# Patient Record
Sex: MALE | Race: WHITE | NOT HISPANIC OR LATINO | Employment: FULL TIME | URBAN - METROPOLITAN AREA
[De-identification: names, ages, dates, MRNs, and addresses within clinical notes are randomized per-mention and may not be internally consistent; named-entity substitution may affect disease eponyms.]

---

## 2017-03-30 ENCOUNTER — PROCEDURE VISIT (OUTPATIENT)
Dept: PULMONOLOGY | Facility: CLINIC | Age: 32
End: 2017-03-30
Payer: COMMERCIAL

## 2017-03-30 ENCOUNTER — OFFICE VISIT (OUTPATIENT)
Dept: SLEEP MEDICINE | Facility: CLINIC | Age: 32
End: 2017-03-30
Payer: COMMERCIAL

## 2017-03-30 ENCOUNTER — HOSPITAL ENCOUNTER (OUTPATIENT)
Dept: RADIOLOGY | Facility: HOSPITAL | Age: 32
Discharge: HOME | End: 2017-03-30
Attending: INTERNAL MEDICINE
Payer: COMMERCIAL

## 2017-03-30 VITALS
HEIGHT: 70 IN | WEIGHT: 141 LBS | OXYGEN SATURATION: 98 % | SYSTOLIC BLOOD PRESSURE: 130 MMHG | HEART RATE: 78 BPM | BODY MASS INDEX: 20.19 KG/M2 | DIASTOLIC BLOOD PRESSURE: 78 MMHG | RESPIRATION RATE: 18 BRPM

## 2017-03-30 DIAGNOSIS — J45.30 MILD PERSISTENT ASTHMA WITHOUT COMPLICATION: ICD-10-CM

## 2017-03-30 DIAGNOSIS — J45.40 MODERATE PERSISTENT ASTHMA WITHOUT COMPLICATION: ICD-10-CM

## 2017-03-30 DIAGNOSIS — J45.20 MILD INTERMITTENT ASTHMA WITHOUT COMPLICATION: Primary | ICD-10-CM

## 2017-03-30 LAB
PRE FEF 25 75: 3.69 L/S (ref 3.73–5.33)
PRE FET 100: 7.73 S
PRE FEV1 FVC: 81 %
PRE FEV1: 4.07 L (ref 4.1–4.89)
PRE FIF 50: 2.04 L/S
PRE FVC: 5.02 L (ref 5.04–5.97)
PRE PEF: 7.81 L/S (ref 9.11–11.43)
PREDICTED FEV1 FVC: 82.07 % (ref 77.24–86.91)
PREDICTED FEV1: 4.5 L (ref 4.1–4.89)
PREDICTED FVC: 5.51 L (ref 5.04–5.97)
PROVOCATION PROTOCOL: ABNORMAL

## 2017-03-30 PROCEDURE — 94010 BREATHING CAPACITY TEST: CPT | Mod: S$GLB,,, | Performed by: INTERNAL MEDICINE

## 2017-03-30 PROCEDURE — 1160F RVW MEDS BY RX/DR IN RCRD: CPT | Mod: S$GLB,,, | Performed by: INTERNAL MEDICINE

## 2017-03-30 PROCEDURE — 99999 PR PBB SHADOW E&M-EST. PATIENT-LVL III: CPT | Mod: PBBFAC,,, | Performed by: INTERNAL MEDICINE

## 2017-03-30 PROCEDURE — 71020 XR CHEST PA AND LATERAL: CPT | Mod: 26,,, | Performed by: RADIOLOGY

## 2017-03-30 PROCEDURE — 71020 XR CHEST PA AND LATERAL: CPT | Mod: TC,PO

## 2017-03-30 PROCEDURE — 99214 OFFICE O/P EST MOD 30 MIN: CPT | Mod: 25,S$GLB,, | Performed by: INTERNAL MEDICINE

## 2017-03-30 RX ORDER — MONTELUKAST SODIUM 10 MG/1
10 TABLET ORAL DAILY
Qty: 90 TABLET | Refills: 3 | Status: SHIPPED | OUTPATIENT
Start: 2017-03-30 | End: 2018-03-30

## 2017-03-30 RX ORDER — ALBUTEROL SULFATE 90 UG/1
2 AEROSOL, METERED RESPIRATORY (INHALATION) EVERY 6 HOURS PRN
Qty: 18 G | Refills: 5 | Status: SHIPPED | OUTPATIENT
Start: 2017-03-30 | End: 2021-11-08

## 2017-03-30 NOTE — ASSESSMENT & PLAN NOTE
ACT was 19  FEV1 improved by 109%, FVC improved by 27%    Immunisations: declined Flu  FEV1 was 4.07L( 91%)  FEV1/FVC 82    Stable

## 2017-03-30 NOTE — PROGRESS NOTES
Subjective:        Luis Skaggs is a 31 y.o. male who has previously been evaluated here for asthma and presents for an asthma follow-up.    Follow-up appointment.  Last clinic visit June 2016.  Asthma.  Score is 19.  No cough no wheezing or shortness of breath  Immunizations pneumococcal up-to-date declined influenza  Medications reviewed Qvar) clean and montelukast  Spirometry show that he has made significant improvements.    FEV1 improved by 109%.  FVC improved by 27%.  Spirometry today is normal FEV1 4.07 (91% predicted) FEV1/FVC 81.  Chest x-ray reviewed normal  Follow-up in 12 months  Medication refills done  Weigth gain from 131 lb to 141 lb      The following portions of the patient's history were reviewed and updated as appropriate:   He  has a past medical history of Asthma.  He  does not have any pertinent problems on file.  He  has a past surgical history that includes Colonoscopy w/ biopsies.  His family history includes Hypertension in his father.  He  reports that he quit smoking about 4 years ago. He has a 3.00 pack-year smoking history. He does not have any smokeless tobacco history on file. He reports that he drinks alcohol. He reports that he does not use illicit drugs.  He has a current medication list which includes the following prescription(s): albuterol, beclomethasone, montelukast, buspirone, hyoscyamine, and pantoprazole.  Current Outpatient Prescriptions on File Prior to Visit   Medication Sig Dispense Refill    [DISCONTINUED] albuterol (VENTOLIN HFA) 90 mcg/actuation inhaler Inhale 2 puffs into the lungs every 6 (six) hours as needed for Wheezing. 18 g 5    [DISCONTINUED] beclomethasone (QVAR) 80 mcg/actuation Aero Inhale 4 puffs into the lungs 2 (two) times daily. 120 each 11    [DISCONTINUED] montelukast (SINGULAIR) 10 mg tablet       busPIRone (BUSPAR) 10 MG tablet Take 1 tablet (10 mg total) by mouth 2 (two) times daily. 30 tablet 0    hyoscyamine (ANASPAZ,LEVSIN) 0.125 mg Tab  "Take 1 tablet (125 mcg total) by mouth 2 (two) times daily as needed. 60 tablet 2    pantoprazole (PROTONIX) 40 MG tablet Take 1 tablet (40 mg total) by mouth once daily. 30 tablet 0     No current facility-administered medications on file prior to visit.      He has No Known Allergies..    Review of Systems  A comprehensive review of systems was negative.      Objective:      /78  Pulse 78  Resp 18  Ht 5' 10" (1.778 m)  Wt 64 kg (141 lb)  SpO2 98%  BMI 20.23 kg/m2  General appearance: alert, appears stated age, cooperative and no distress  Eyes: negative findings: conjunctivae and sclerae normal  Nose: no discharge  Throat: lips, mucosa, and tongue normal; teeth and gums normal  Neck: no adenopathy, no carotid bruit, no JVD, supple, symmetrical, trachea midline and thyroid not enlarged, symmetric, no tenderness/mass/nodules  Lungs: clear to auscultation bilaterally and normal percussion bilaterally  Heart: regular rate and rhythm, S1, S2 normal, no murmur, click, rub or gallop  Abdomen: soft, non-tender; bowel sounds normal; no masses,  no organomegaly  Extremities: extremities normal, atraumatic, no cyanosis or edema  Pulses: 2+ and symmetric  Skin: Skin color, texture, turgor normal. No rashes or lesions  Lymph nodes: Cervical, supraclavicular, and axillary nodes normal.  Neurologic: Grossly normal    Diagnostic Review  Chest x-ray:   Technique: PA and lateral views of the chest was obtained    Findings: The cardiac and mediastinal silhouettes are within normal limits.   The lungs are clear bilaterally. Visualized osseous structures demonstrate no acute abnormality.       Impression        No acute findings           Pulmonary function tests: FEV1: 4.07  (91 % predicted), FVC:  5.02 (91 % predicted), FEV1/FVC:  81       Assessment:      Mild persistent asthma. The patient is not currently having an exacerbation. In general, the patient's disease is well controlled.       Problem List Items Addressed " This Visit     Asthma - Primary     ACT was 19  FEV1 improved by 109%, FVC improved by 27%    Immunisations: declined Flu  FEV1 was 4.07L( 91%)  FEV1/FVC 82    Stable         Relevant Medications    beclomethasone (QVAR) 80 mcg/actuation Aero    albuterol (VENTOLIN HFA) 90 mcg/actuation inhaler    montelukast (SINGULAIR) 10 mg tablet    Other Relevant Orders    X-Ray Chest PA And Lateral    Spirometry with/without bronchodilator        Plan:      Return in about 1 year (around 3/30/2018) for cxr, dylan.    This note was prepared using voice recognition system and is likely to have sound alike errors that may have been overlooked even after proof reading.  Please call me with any questions    Discussed diagnosis, its evaluation, treatment and usual course. All questions answered.    Thank you for the courtesy of participating in the care of this patient    South Schwartz MD

## 2017-03-30 NOTE — MR AVS SNAPSHOT
[facility]  [address]  [place] [address]  Phone: [phone]                  Luis Skaggs   3/30/2017 2:00 PM   Office Visit    Description:  Male : 1985   Provider:  South Schwartz MD   Department:  [facility]           Reason for Visit     Asthma           Diagnoses this Visit        Comments    Mild intermittent asthma without complication    -  Primary            To Do List           Future Appointments        Provider Department Dept Phone    3/9/2018 10:00 AM [facility] XR2 [facility] [phone]    3/9/2018 10:20 AM PULMONARY LAB, [facility] [phone]    3/9/2018 10:40 AM South Schwartz MD [facility] [phone]      Goals (5 Years of Data)     None      Follow-Up and Disposition     Return in about 1 year (around 3/30/2018) for cxr, dylan.       These Medications        Disp Refills Start End    beclomethasone (QVAR) 80 mcg/actuation Aero 120 each 11 3/30/2017 3/30/2018    Inhale 4 puffs into the lungs 2 (two) times daily. - Inhalation    Pharmacy: [address] [address] [address] [place] Ph #: [phone]       albuterol (VENTOLIN HFA) 90 mcg/actuation inhaler 18 g 5 3/30/2017 3/30/2018    Inhale 2 puffs into the lungs every 6 (six) hours as needed for Wheezing. - Inhalation    Pharmacy: [address] Ph #: [phone]       Notes to Pharmacy: Please call patient to pick prescription once it is ready.    montelukast (SINGULAIR) 10 mg tablet 90 tablet 3 3/30/2017 3/30/2018    Take 1 tablet (10 mg total) by mouth once daily. - Oral    Pharmacy: [address] - [address] [address] Ph #: [phone]         [facility]     [facility] Nurse Care Line -  Assistance  Unless otherwise directed by your provider, please contact [facility],  "our nurse care line that is available for 24/7 assistance.     Registered nurses in the [facility] provide: appointment scheduling, clinical advisement, health education, and other advisory services.  Call: [phone] (toll free)               Medications           Message regarding Medications     Verify the changes and/or additions to your medication regime listed below are the same as discussed with your clinician today.  If any of these changes or additions are incorrect, please notify your healthcare provider.        CHANGE how you are taking these medications     Start Taking Instead of    montelukast (SINGULAIR) 10 mg tablet montelukast (SINGULAIR) 10 mg tablet    Dosage:  Take 1 tablet (10 mg total) by mouth once daily.     Reason for Change:  Reorder            Verify that the below list of medications is an accurate representation of the medications you are currently taking.  If none reported, the list may be blank. If incorrect, please contact your healthcare provider. Carry this list with you in case of emergency.           Current Medications     albuterol (VENTOLIN HFA) 90 mcg/actuation inhaler Inhale 2 puffs into the lungs every 6 (six) hours as needed for Wheezing.    beclomethasone (QVAR) 80 mcg/actuation Aero Inhale 4 puffs into the lungs 2 (two) times daily.    busPIRone (BUSPAR) 10 MG tablet Take 1 tablet (10 mg total) by mouth 2 (two) times daily.    hyoscyamine (ANASPAZ,LEVSIN) 0.125 mg Tab Take 1 tablet (125 mcg total) by mouth 2 (two) times daily as needed.    montelukast (SINGULAIR) 10 mg tablet Take 1 tablet (10 mg total) by mouth once daily.    pantoprazole (PROTONIX) 40 MG tablet Take 1 tablet (40 mg total) by mouth once daily.           Clinical Reference Information           Your Vitals Were     BP Pulse Resp Height Weight SpO2    130/78 78 18 5' 10" (1.778 m) 64 kg (141 lb) 98%    BMI                20.23 kg/m2          Blood Pressure          Most Recent Value    BP  " 130/78      Allergies as of 3/30/2017     No Known Allergies      Immunizations Administered on Date of Encounter - 3/30/2017     None      Orders Placed During Today's Visit     Future Labs/Procedures Expected by Expires    X-Ray Chest PA And Lateral  3/30/2017 3/30/2018    Spirometry with/without bronchodilator  As directed 3/30/2018      Language Assistance Services     ATTENTION: Language assistance services are available, free of charge. Please call [phone].      ATENCIÓN: Si habla español, tiene a platt disposición servicios gratuitos de asistencia lingüística. Llame al [phone].     CHÚ Ý: N?u b?n nói Ti?ng Vi?t, có các d?ch v? h? tr? ngôn ng? mi?n phí dành cho b?n. G?i s? [phone].         [facility] complies with applicable Federal civil rights laws and does not discriminate on the basis of race, color, national origin, age, disability, or sex.

## 2017-07-27 ENCOUNTER — TELEPHONE (OUTPATIENT)
Dept: DERMATOLOGY | Facility: CLINIC | Age: 32
End: 2017-07-27

## 2017-07-27 NOTE — TELEPHONE ENCOUNTER
----- Message from Dayna Clayton sent at 7/27/2017  8:24 AM CDT -----  Contact: pt mom-Libby  States patient is scheduled for an appt on today with NP. States wants to know if the patient can be worked in for an appt on next week with Dr. Souza was advised of availability. Please adv/call [phone].//thanks. cw      Spoke with mom and informed her I spoke to patient and got him scheduled for 9/18/17 with Dr Rudd and he is also put on a waiting list so if someone cancels he can be seen sooner.  Mr Sharifa verbalized understanding to information given and so did mom.

## 2017-08-13 ENCOUNTER — HOSPITAL ENCOUNTER (EMERGENCY)
Facility: HOSPITAL | Age: 32
Discharge: HOME | End: 2017-08-13
Attending: EMERGENCY MEDICINE
Payer: COMMERCIAL

## 2017-08-13 DIAGNOSIS — R06.00 DYSPNEA, UNSPECIFIED TYPE: Primary | ICD-10-CM

## 2017-08-13 PROCEDURE — 99284 EMERGENCY DEPT VISIT MOD MDM: CPT

## 2017-08-13 NOTE — ED NOTES
Pt refusing to allow staff to get vitals. Does not appear in any distress. Alert and oriented. Law enforcement at BS and pt handcuffed to stretcher.

## 2017-08-13 NOTE — ED NOTES
Pt still refusing to allow us to obtain vitals. Dr. Schaeffer in room and pt is refusing to allow him to assess. Pt states that we need to call his GI doctor and that he only wants his GI doctor treating him. Dr. Schaeffer explained the ED process of consulting specialists for patients and that it requires for us to assess patients. Pt still refuses to allow us to treat or assess him. Dr. Schaeffer asked pt who his GI doctor is but pt wasn't able to provide their name. Pt does not appear to be in any distress.  at BS and pt still handcuffed to bed. Pt is currently being disrespectful to staff and disturbing neighboring patient.

## 2017-08-13 NOTE — ED NOTES
Patient is still refusing to allow me to obtain vitals and Dr. Schaeffer to assess. He refuses to sign the AMA paper but is refusing treatment and assessment. Refusal witnessed by CATHLEEN Cramer.

## 2017-08-13 NOTE — ED PROVIDER NOTES
"SCRIBE #1 NOTE: I, Selena Priceo, am scribing for, and in the presence of, Kofi Schaeffer Jr., MD. I have scribed the entire note.      History      Chief Complaint   Patient presents with    Shortness of Breath     onset after police stopped pt. refused vitals from paramedics.       Review of patient's allergies indicates:  No Known Allergies     HPI   HPI    8/13/2017, 1:41 AM   History obtained from the patient      History of Present Illness: Luis Skaggs is a 32 y.o. male patient who presents to the Emergency Department for evaluation. Per , pt was pulled over for suspicion of DWI when began saying he was SOB. Per EMS, pt used his own albuterol inhaler on scene. Pt refused vitals from paramedics. Pt states SOB has resolved.  Pt speaking in full sentences and declining to answer general questions, like "when did your symptoms start?" and "what medications are you taking?"  Pt is able to walk unassisted, but is extremely agitated since being placed under arrest.  Police present. Pt is refusing vitals and examination in ED. Pt is requesting to speak his gastroenterologist. HPI limited secondary to patient being non-compliant.    Arrival mode: EMS     PCP: Zohaib Liu MD       Past Medical History:  Past Medical History:   Diagnosis Date    Asthma        Past Surgical History:  Past Surgical History:   Procedure Laterality Date    COLONOSCOPY W/ BIOPSIES           Family History:  Family History   Problem Relation Age of Onset    Hypertension Father        Social History:  Social History     Social History Main Topics    Smoking status: Former Smoker     Packs/day: 1.00     Years: 3.00     Quit date: 1/19/2013    Smokeless tobacco: Unknown    Alcohol use Yes    Drug use: No    Sexual activity: Yes     Partners: Female     Birth control/ protection: Condom, OCP       ROS   Review of Systems   Unable to perform ROS: Other       Physical Exam      Initial Vitals   BP Pulse Resp " Temp SpO2   -- -- -- -- --      MAP       --          Physical Exam  Nursing Notes Reviewed.  Pt refused physical exam and vital signs.   Constitutional: Patient is in no acute distress. Well-developed and well-nourished.  Head: Atraumatic. Normocephalic.  Pulmonary/Chest: Pt appears to be breathing without difficulty.  Neurological:  Pt is able to speak in full sentences without difficulty.   Psychiatric: Alert and oriented. No HI or SI.    ED Course    Procedures  ED Vital Signs:  There were no vitals filed for this visit.           The Emergency Provider reviewed the vital signs and test results, which are outlined above.    ED Discussion     1:48 AM: Re-evaluated pt. Asked to examine patient, but pt refused on 3 separate occasions during ER visit. Discussed with patient that he cannot be treated without exam or not answering questions. Pt is refusing examine, specifically listening to his heart and lungs. Pt refused labs, imaging studies, and treatment including breathing treatments or steroids.  at bedside. Pt is A&O x3, appropriate and competent at this time. Pt voices desire to leave hospital. D/w pt in length need for further evaluation and treatment due to HPI and PEx. Pt declines any further evaluation or tx at this time. All risks, including worsening sx, permanent bodily harm and death, were discussed in length. Pt acknowledges all risk at this time and agrees to sign AMA form. Pt given RTER instructions. All questions and concerns addressed at this time. Pt leaving AMA.     ED Medication(s):  Medications - No data to display    Discharge Medication List as of 8/13/2017  1:58 AM          Follow-up Information     Zohaib Liu MD. Schedule an appointment as soon as possible for a visit in 1 week.    Specialty:  Internal Medicine  Contact information:  [address] [place] [address] [phone]             [facility]    Specialty:  Emergency  Medicine  Why:  As needed, If symptoms worsen  Contact information:  [address]  [facility] [address] [phone]                   Medical Decision Making              Scribe Attestation:   Scribe #1: I performed the above scribed service and the documentation accurately describes the services I performed. I attest to the accuracy of the note.    Attending:   Physician Attestation Statement for Scribe #1: I, Kofi Schaeffer Jr., MD, personally performed the services described in this documentation, as scribed by Selena Milian, in my presence, and it is both accurate and complete.          Clinical Impression       ICD-10-CM ICD-9-CM   1. Dyspnea, unspecified type R06.00 786.09       Disposition:   Disposition: AMA  Condition: Fair         Kofi Schaeffer Jr., MD  08/14/17 0034

## 2017-08-14 NOTE — ED NOTES
8/14/17 @ [facility] status noted. Chart and provider note reviewed. No further actions indicated or taken at this time.

## 2021-04-28 ENCOUNTER — PATIENT MESSAGE (OUTPATIENT)
Dept: RESEARCH | Facility: HOSPITAL | Age: 36
End: 2021-04-28

## 2021-11-08 ENCOUNTER — HOSPITAL ENCOUNTER (EMERGENCY)
Facility: HOSPITAL | Age: 36
Discharge: HOME | End: 2021-11-08
Attending: EMERGENCY MEDICINE
Payer: COMMERCIAL

## 2021-11-08 VITALS
HEART RATE: 75 BPM | DIASTOLIC BLOOD PRESSURE: 61 MMHG | RESPIRATION RATE: 13 BRPM | SYSTOLIC BLOOD PRESSURE: 114 MMHG | TEMPERATURE: 98 F | OXYGEN SATURATION: 97 %

## 2021-11-08 DIAGNOSIS — R55 SYNCOPE: Primary | ICD-10-CM

## 2021-11-08 LAB
ALBUMIN SERPL BCP-MCNC: 3.9 G/DL (ref 3.5–5.2)
ALP SERPL-CCNC: 92 U/L (ref 55–135)
ALT SERPL W/O P-5'-P-CCNC: 16 U/L (ref 10–44)
AMPHET+METHAMPHET UR QL: NEGATIVE
ANION GAP SERPL CALC-SCNC: 12 MMOL/L (ref 8–16)
AST SERPL-CCNC: 20 U/L (ref 10–40)
BARBITURATES UR QL SCN>200 NG/ML: NEGATIVE
BASOPHILS # BLD AUTO: 0.03 K/UL (ref 0–0.2)
BASOPHILS NFR BLD: 0.5 % (ref 0–1.9)
BENZODIAZ UR QL SCN>200 NG/ML: NEGATIVE
BILIRUB SERPL-MCNC: 0.4 MG/DL (ref 0.1–1)
BILIRUB UR QL STRIP: NEGATIVE
BNP SERPL-MCNC: 29 PG/ML (ref 0–99)
BUN SERPL-MCNC: 13 MG/DL (ref 6–20)
BZE UR QL SCN: NEGATIVE
CALCIUM SERPL-MCNC: 8.9 MG/DL (ref 8.7–10.5)
CANNABINOIDS UR QL SCN: ABNORMAL
CHLORIDE SERPL-SCNC: 106 MMOL/L (ref 95–110)
CLARITY UR: CLEAR
CO2 SERPL-SCNC: 23 MMOL/L (ref 23–29)
COLOR UR: YELLOW
CREAT SERPL-MCNC: 1.1 MG/DL (ref 0.5–1.4)
CREAT UR-MCNC: 88 MG/DL (ref 23–375)
DIFFERENTIAL METHOD: ABNORMAL
EOSINOPHIL # BLD AUTO: 0.1 K/UL (ref 0–0.5)
EOSINOPHIL NFR BLD: 1.9 % (ref 0–8)
ERYTHROCYTE [DISTWIDTH] IN BLOOD BY AUTOMATED COUNT: 12.6 % (ref 11.5–14.5)
EST. GFR  (AFRICAN AMERICAN): >60 ML/MIN/1.73 M^2
EST. GFR  (NON AFRICAN AMERICAN): >60 ML/MIN/1.73 M^2
ETHANOL SERPL-MCNC: <10 MG/DL
GLUCOSE SERPL-MCNC: 104 MG/DL (ref 70–110)
GLUCOSE UR QL STRIP: NEGATIVE
HCT VFR BLD AUTO: 36.3 % (ref 40–54)
HGB BLD-MCNC: 12.1 G/DL (ref 14–18)
HGB UR QL STRIP: NEGATIVE
IMM GRANULOCYTES # BLD AUTO: 0.02 K/UL (ref 0–0.04)
IMM GRANULOCYTES NFR BLD AUTO: 0.3 % (ref 0–0.5)
KETONES UR QL STRIP: NEGATIVE
LEUKOCYTE ESTERASE UR QL STRIP: NEGATIVE
LYMPHOCYTES # BLD AUTO: 1.7 K/UL (ref 1–4.8)
LYMPHOCYTES NFR BLD: 26.8 % (ref 18–48)
MCH RBC QN AUTO: 32 PG (ref 27–31)
MCHC RBC AUTO-ENTMCNC: 33.3 G/DL (ref 32–36)
MCV RBC AUTO: 96 FL (ref 82–98)
METHADONE UR QL SCN>300 NG/ML: NEGATIVE
MONOCYTES # BLD AUTO: 0.3 K/UL (ref 0.3–1)
MONOCYTES NFR BLD: 4.6 % (ref 4–15)
NEUTROPHILS # BLD AUTO: 4.2 K/UL (ref 1.8–7.7)
NEUTROPHILS NFR BLD: 65.9 % (ref 38–73)
NITRITE UR QL STRIP: NEGATIVE
NRBC BLD-RTO: 0 /100 WBC
OPIATES UR QL SCN: NEGATIVE
PCP UR QL SCN>25 NG/ML: NEGATIVE
PH UR STRIP: 6 [PH] (ref 5–8)
PLATELET # BLD AUTO: 132 K/UL (ref 150–450)
PMV BLD AUTO: 11.4 FL (ref 9.2–12.9)
POTASSIUM SERPL-SCNC: 3.9 MMOL/L (ref 3.5–5.1)
PROT SERPL-MCNC: 6.6 G/DL (ref 6–8.4)
PROT UR QL STRIP: ABNORMAL
RBC # BLD AUTO: 3.78 M/UL (ref 4.6–6.2)
SODIUM SERPL-SCNC: 141 MMOL/L (ref 136–145)
SP GR UR STRIP: >=1.03 (ref 1–1.03)
TOXICOLOGY INFORMATION: ABNORMAL
TROPONIN I SERPL DL<=0.01 NG/ML-MCNC: <0.006 NG/ML (ref 0–0.03)
URN SPEC COLLECT METH UR: ABNORMAL
UROBILINOGEN UR STRIP-ACNC: NEGATIVE EU/DL
WBC # BLD AUTO: 6.34 K/UL (ref 3.9–12.7)

## 2021-11-08 PROCEDURE — 85025 COMPLETE CBC W/AUTO DIFF WBC: CPT | Performed by: EMERGENCY MEDICINE

## 2021-11-08 PROCEDURE — 82077 ASSAY SPEC XCP UR&BREATH IA: CPT | Performed by: EMERGENCY MEDICINE

## 2021-11-08 PROCEDURE — 99285 EMERGENCY DEPT VISIT HI MDM: CPT | Mod: 25

## 2021-11-08 PROCEDURE — 83880 ASSAY OF NATRIURETIC PEPTIDE: CPT | Performed by: EMERGENCY MEDICINE

## 2021-11-08 PROCEDURE — 93010 EKG 12-LEAD: ICD-10-PCS | Mod: ,,, | Performed by: INTERNAL MEDICINE

## 2021-11-08 PROCEDURE — 80307 DRUG TEST PRSMV CHEM ANLYZR: CPT | Performed by: EMERGENCY MEDICINE

## 2021-11-08 PROCEDURE — 96360 HYDRATION IV INFUSION INIT: CPT

## 2021-11-08 PROCEDURE — 80053 COMPREHEN METABOLIC PANEL: CPT | Performed by: EMERGENCY MEDICINE

## 2021-11-08 PROCEDURE — 81003 URINALYSIS AUTO W/O SCOPE: CPT | Mod: 59 | Performed by: EMERGENCY MEDICINE

## 2021-11-08 PROCEDURE — 93005 ELECTROCARDIOGRAM TRACING: CPT

## 2021-11-08 PROCEDURE — 93010 ELECTROCARDIOGRAM REPORT: CPT | Mod: ,,, | Performed by: INTERNAL MEDICINE

## 2021-11-08 PROCEDURE — 25000003 PHARM REV CODE 250: Performed by: EMERGENCY MEDICINE

## 2021-11-08 PROCEDURE — 84484 ASSAY OF TROPONIN QUANT: CPT | Performed by: EMERGENCY MEDICINE

## 2021-11-08 RX ORDER — EPINEPHRINE INHALATION 125 UG/1
1 AEROSOL RESPIRATORY (INHALATION) DAILY PRN
COMMUNITY
End: 2022-08-04

## 2021-11-08 RX ORDER — DIPHENHYDRAMINE HCL 25 MG
25 CAPSULE ORAL EVERY 6 HOURS PRN
COMMUNITY
End: 2022-08-04

## 2021-11-08 RX ADMIN — SODIUM CHLORIDE 1000 ML: 0.9 INJECTION, SOLUTION INTRAVENOUS at 08:11

## 2021-12-20 ENCOUNTER — HOSPITAL ENCOUNTER (EMERGENCY)
Facility: HOSPITAL | Age: 36
Discharge: HOME | End: 2021-12-20
Attending: EMERGENCY MEDICINE
Payer: COMMERCIAL

## 2021-12-20 ENCOUNTER — NURSE TRIAGE (OUTPATIENT)
Dept: ADMINISTRATIVE | Facility: CLINIC | Age: 36
End: 2021-12-20
Payer: COMMERCIAL

## 2021-12-20 VITALS
SYSTOLIC BLOOD PRESSURE: 109 MMHG | HEIGHT: 70 IN | WEIGHT: 148.81 LBS | HEART RATE: 73 BPM | TEMPERATURE: 98 F | RESPIRATION RATE: 16 BRPM | OXYGEN SATURATION: 100 % | BODY MASS INDEX: 21.3 KG/M2 | DIASTOLIC BLOOD PRESSURE: 56 MMHG

## 2021-12-20 DIAGNOSIS — R56.9 SEIZURE: ICD-10-CM

## 2021-12-20 LAB
ALBUMIN SERPL BCP-MCNC: 3.8 G/DL (ref 3.5–5.2)
ALP SERPL-CCNC: 103 U/L (ref 55–135)
ALT SERPL W/O P-5'-P-CCNC: 18 U/L (ref 10–44)
AMPHET+METHAMPHET UR QL: NEGATIVE
ANION GAP SERPL CALC-SCNC: 8 MMOL/L (ref 8–16)
AST SERPL-CCNC: 22 U/L (ref 10–40)
BARBITURATES UR QL SCN>200 NG/ML: NEGATIVE
BASOPHILS # BLD AUTO: 0.02 K/UL (ref 0–0.2)
BASOPHILS NFR BLD: 0.3 % (ref 0–1.9)
BENZODIAZ UR QL SCN>200 NG/ML: NEGATIVE
BILIRUB SERPL-MCNC: 0.3 MG/DL (ref 0.1–1)
BILIRUB UR QL STRIP: NEGATIVE
BUN SERPL-MCNC: 14 MG/DL (ref 6–20)
BZE UR QL SCN: NEGATIVE
CALCIUM SERPL-MCNC: 8.7 MG/DL (ref 8.7–10.5)
CANNABINOIDS UR QL SCN: NEGATIVE
CHLORIDE SERPL-SCNC: 106 MMOL/L (ref 95–110)
CLARITY UR: CLEAR
CO2 SERPL-SCNC: 28 MMOL/L (ref 23–29)
COLOR UR: YELLOW
CREAT SERPL-MCNC: 1 MG/DL (ref 0.5–1.4)
CREAT UR-MCNC: 125.1 MG/DL (ref 23–375)
DIFFERENTIAL METHOD: ABNORMAL
EOSINOPHIL # BLD AUTO: 0.1 K/UL (ref 0–0.5)
EOSINOPHIL NFR BLD: 1.7 % (ref 0–8)
ERYTHROCYTE [DISTWIDTH] IN BLOOD BY AUTOMATED COUNT: 12.4 % (ref 11.5–14.5)
EST. GFR  (AFRICAN AMERICAN): >60 ML/MIN/1.73 M^2
EST. GFR  (NON AFRICAN AMERICAN): >60 ML/MIN/1.73 M^2
ETHANOL SERPL-MCNC: <10 MG/DL
GLUCOSE SERPL-MCNC: 96 MG/DL (ref 70–110)
GLUCOSE UR QL STRIP: NEGATIVE
HCT VFR BLD AUTO: 34.5 % (ref 40–54)
HCV AB SERPL QL IA: NEGATIVE
HEP C VIRUS HOLD SPECIMEN: NORMAL
HGB BLD-MCNC: 11.3 G/DL (ref 14–18)
HGB UR QL STRIP: NEGATIVE
HIV 1+2 AB+HIV1 P24 AG SERPL QL IA: NEGATIVE
IMM GRANULOCYTES # BLD AUTO: 0.01 K/UL (ref 0–0.04)
IMM GRANULOCYTES NFR BLD AUTO: 0.2 % (ref 0–0.5)
KETONES UR QL STRIP: NEGATIVE
LEUKOCYTE ESTERASE UR QL STRIP: NEGATIVE
LYMPHOCYTES # BLD AUTO: 2.1 K/UL (ref 1–4.8)
LYMPHOCYTES NFR BLD: 36 % (ref 18–48)
MCH RBC QN AUTO: 31.9 PG (ref 27–31)
MCHC RBC AUTO-ENTMCNC: 32.8 G/DL (ref 32–36)
MCV RBC AUTO: 98 FL (ref 82–98)
METHADONE UR QL SCN>300 NG/ML: NEGATIVE
MONOCYTES # BLD AUTO: 0.4 K/UL (ref 0.3–1)
MONOCYTES NFR BLD: 7.2 % (ref 4–15)
NEUTROPHILS # BLD AUTO: 3.2 K/UL (ref 1.8–7.7)
NEUTROPHILS NFR BLD: 54.6 % (ref 38–73)
NITRITE UR QL STRIP: NEGATIVE
NRBC BLD-RTO: 0 /100 WBC
OPIATES UR QL SCN: NEGATIVE
PCP UR QL SCN>25 NG/ML: NEGATIVE
PH UR STRIP: 7 [PH] (ref 5–8)
PLATELET # BLD AUTO: 187 K/UL (ref 150–450)
PMV BLD AUTO: 11.1 FL (ref 9.2–12.9)
POTASSIUM SERPL-SCNC: 4.3 MMOL/L (ref 3.5–5.1)
PROT SERPL-MCNC: 6.6 G/DL (ref 6–8.4)
PROT UR QL STRIP: NEGATIVE
RBC # BLD AUTO: 3.54 M/UL (ref 4.6–6.2)
SODIUM SERPL-SCNC: 142 MMOL/L (ref 136–145)
SP GR UR STRIP: 1.02 (ref 1–1.03)
TOXICOLOGY INFORMATION: NORMAL
URN SPEC COLLECT METH UR: NORMAL
UROBILINOGEN UR STRIP-ACNC: NEGATIVE EU/DL
WBC # BLD AUTO: 5.86 K/UL (ref 3.9–12.7)

## 2021-12-20 PROCEDURE — 82077 ASSAY SPEC XCP UR&BREATH IA: CPT | Performed by: NURSE PRACTITIONER

## 2021-12-20 PROCEDURE — 85025 COMPLETE CBC W/AUTO DIFF WBC: CPT | Performed by: NURSE PRACTITIONER

## 2021-12-20 PROCEDURE — 93010 EKG 12-LEAD: ICD-10-PCS | Mod: ,,, | Performed by: INTERNAL MEDICINE

## 2021-12-20 PROCEDURE — 36000 PLACE NEEDLE IN VEIN: CPT

## 2021-12-20 PROCEDURE — 87389 HIV-1 AG W/HIV-1&-2 AB AG IA: CPT | Performed by: EMERGENCY MEDICINE

## 2021-12-20 PROCEDURE — 81003 URINALYSIS AUTO W/O SCOPE: CPT | Mod: 59 | Performed by: NURSE PRACTITIONER

## 2021-12-20 PROCEDURE — 86803 HEPATITIS C AB TEST: CPT | Performed by: EMERGENCY MEDICINE

## 2021-12-20 PROCEDURE — 99285 EMERGENCY DEPT VISIT HI MDM: CPT | Mod: 25

## 2021-12-20 PROCEDURE — 93005 ELECTROCARDIOGRAM TRACING: CPT

## 2021-12-20 PROCEDURE — 93010 ELECTROCARDIOGRAM REPORT: CPT | Mod: ,,, | Performed by: INTERNAL MEDICINE

## 2021-12-20 PROCEDURE — 80307 DRUG TEST PRSMV CHEM ANLYZR: CPT | Performed by: NURSE PRACTITIONER

## 2021-12-20 PROCEDURE — 25000003 PHARM REV CODE 250: Performed by: EMERGENCY MEDICINE

## 2021-12-20 PROCEDURE — 80053 COMPREHEN METABOLIC PANEL: CPT | Performed by: NURSE PRACTITIONER

## 2021-12-20 RX ORDER — LEVETIRACETAM 500 MG/1
1000 TABLET ORAL ONCE
Status: COMPLETED | OUTPATIENT
Start: 2021-12-20 | End: 2021-12-20

## 2021-12-20 RX ORDER — LEVETIRACETAM 500 MG/1
500 TABLET ORAL 2 TIMES DAILY
Qty: 60 TABLET | Refills: 1 | Status: SHIPPED | OUTPATIENT
Start: 2021-12-20 | End: 2022-02-10 | Stop reason: SDUPTHER

## 2021-12-20 RX ADMIN — LEVETIRACETAM 1000 MG: 500 TABLET, FILM COATED ORAL at 05:12

## 2021-12-27 PROBLEM — G56.01 CARPAL TUNNEL SYNDROME, RIGHT UPPER LIMB: Status: ACTIVE | Noted: 2020-08-25

## 2021-12-28 ENCOUNTER — OFFICE VISIT (OUTPATIENT)
Dept: FAMILY MEDICINE | Facility: CLINIC | Age: 36
End: 2021-12-28
Payer: COMMERCIAL

## 2021-12-28 VITALS
HEIGHT: 70 IN | SYSTOLIC BLOOD PRESSURE: 129 MMHG | HEART RATE: 103 BPM | OXYGEN SATURATION: 99 % | TEMPERATURE: 98 F | WEIGHT: 142.5 LBS | DIASTOLIC BLOOD PRESSURE: 68 MMHG | BODY MASS INDEX: 20.4 KG/M2

## 2021-12-28 DIAGNOSIS — F19.20 DRUG ABUSE AND DEPENDENCE: ICD-10-CM

## 2021-12-28 DIAGNOSIS — G40.909 SEIZURE DISORDER: Primary | ICD-10-CM

## 2021-12-28 PROCEDURE — 99999 PR PBB SHADOW E&M-EST. PATIENT-LVL III: ICD-10-PCS | Mod: PBBFAC,,, | Performed by: REGISTERED NURSE

## 2021-12-28 PROCEDURE — 99214 OFFICE O/P EST MOD 30 MIN: CPT | Mod: S$GLB,,, | Performed by: REGISTERED NURSE

## 2021-12-28 PROCEDURE — 99214 PR OFFICE/OUTPT VISIT, EST, LEVL IV, 30-39 MIN: ICD-10-PCS | Mod: S$GLB,,, | Performed by: REGISTERED NURSE

## 2021-12-28 PROCEDURE — 99999 PR PBB SHADOW E&M-EST. PATIENT-LVL III: CPT | Mod: PBBFAC,,, | Performed by: REGISTERED NURSE

## 2022-02-09 ENCOUNTER — TELEPHONE (OUTPATIENT)
Dept: NEUROLOGY | Facility: CLINIC | Age: 37
End: 2022-02-09
Payer: COMMERCIAL

## 2022-02-09 NOTE — TELEPHONE ENCOUNTER
Spoke with pt mother in regards to getting medication refill. Advise mother that her appt was scheduled incorrectly, pt would need to establish care with Dr. Tipton. She would have to contact pt PCP in regards to get refill. I can assist her with scheduling appt correctly, she verbalized understanding and advise to give her a call in about an hour since she is traveling.

## 2022-02-09 NOTE — TELEPHONE ENCOUNTER
----- Message from Eufemia Clayton sent at 2/9/2022  2:58 PM CST -----  Contact: [phone]/ Mom Libby  Patient would like to get medical advice.    Comments:   Patient mom would like to know what should do about medication. States until he is seen by a neurologist.

## 2022-02-09 NOTE — TELEPHONE ENCOUNTER
----- Message from Candice Huggins sent at 2/9/2022 12:02 PM CST -----  Contact: Mrs Skaggs  Mother would like to have a call back at  in regards to getting a temporary supply for the patient's medication.    Medication : levETIRAcetam (KEPPRA) 500 MG [address] [place] [address] [facility]  [address] [facility]  [place] [address]  Phone: [phone] Fax: [phone]    Thanks

## 2022-02-09 NOTE — TELEPHONE ENCOUNTER
Contacted pt mother in regards to appt scheduled incorrectly. Pt mother was trying to get refill for his Keppra medication advise her we weren't able to give her refills because we have not seen patient. She would need to contact his PCP and she verbalized that they weren't doing anything to help and why I was telling her she needed to go to them. After we got the appointment scheduled correctly she was going on about how it was far out and she needed to be seen sooner. I let her know we only have one neurologist doctor. Told her I added him to the waiting list in case something sooner becomes available, she said she heard that one before and that she couldn't wait until she got the survey in the mail. Ask for my name and Neurology  name. I apologized to her for the in convince in regards to appt being scheduled incorrectly and medication refill.

## 2022-02-10 RX ORDER — LEVETIRACETAM 500 MG/1
500 TABLET ORAL 2 TIMES DAILY
Qty: 60 TABLET | Refills: 0 | Status: SHIPPED | OUTPATIENT
Start: 2022-02-10 | End: 2022-03-11

## 2022-02-10 NOTE — TELEPHONE ENCOUNTER
Spoke with pt's mom and she stated that pt is on his last 2 pills for seizures and he needs a refill. Mom is out of town and she has been trying to get in touch with Yaneli Martin for a refill since Tuesday and still no response. Please advise

## 2022-02-11 NOTE — TELEPHONE ENCOUNTER
Spoken to mother again.  She was very ugly towards me.  I have faxed the Rx over 3 times and have gotten conformation that they have received it.  She said that I need to stop everything that I am doing right now and make sure the Rx is at [facility].  Told her I am in clinic and I will get to it today.  She said she doesn't care that I am in clinic and wants this taken care of now.  I have spoken to her earlier today and told her it will be taken care of today and that I could not give her a time frame.  She hung up

## 2022-02-11 NOTE — TELEPHONE ENCOUNTER
----- Message from Ada Muller MA sent at 2/11/2022  7:57 AM CST -----  SEE MESSAGE BELOW. PT NEVER SEEN [facility]        States his seizure medication (Keppra) is still not at the pharmacy. Today is his last day of meds. Pt uses     [facility] [address] [place], [place] - [address]   [address] [address] [address]   Phone: [phone] Fax: [phone]     Please call Libby Skaggs [phone]. Thank you         ----- Message -----  From: Julia Marks  Sent: 2/11/2022   7:56 AM CST  To: [facility]oyer Staff    States his seizure medication (Keppra) is still not at the pharmacy. Today is his last day of meds. States she has been speaking with Marilyn. Pt uses     [facility] - [place], [place] - [address]  [address] [address] [place] [address]  Phone: [phone] Fax: [phone]    Please call Libby Skaggs [phone]. Thank you

## 2022-02-11 NOTE — TELEPHONE ENCOUNTER
----- Message from Candice Huggins sent at 2/11/2022  9:28 AM CST -----  Contact: Mrs Skaggs  Mother would like a call back at [phone]  in regards to the patient's medication levETIRAcetam (KEPPRA) 500 MG Tab. She states that the pharmacy does not have it yet. She states that he does not have medication for today.      [address] [place] [address] [address]  [address] [address] [place] [address]  Phone: [phone] Fax: [phone]      Thanks

## 2022-02-11 NOTE — TELEPHONE ENCOUNTER
----- Message from Shanon Blancas sent at 2/11/2022 11:11 AM CST -----  Contact: Mother, Libby, [phone]  Calling because the pharmacy did not receive Rx levETIRAcetam (KEPPRA) 500 MG Tab. This needs to be sent today. He will be out of medication tomorrow. The patient's mother is very upset. Please call her. Thanks,        [place] [address] [address] [place] [address] [facility]  [address] [address] [place] [address]  Phone: [phone] Fax: [phone]

## 2022-02-17 ENCOUNTER — LAB VISIT (OUTPATIENT)
Dept: LAB | Facility: HOSPITAL | Age: 37
End: 2022-02-17
Attending: INTERNAL MEDICINE
Payer: COMMERCIAL

## 2022-02-17 ENCOUNTER — OFFICE VISIT (OUTPATIENT)
Dept: FAMILY MEDICINE | Facility: CLINIC | Age: 37
End: 2022-02-17
Payer: COMMERCIAL

## 2022-02-17 VITALS
RESPIRATION RATE: 16 BRPM | SYSTOLIC BLOOD PRESSURE: 120 MMHG | BODY MASS INDEX: 20.5 KG/M2 | OXYGEN SATURATION: 98 % | TEMPERATURE: 98 F | DIASTOLIC BLOOD PRESSURE: 80 MMHG | HEART RATE: 61 BPM | WEIGHT: 142.88 LBS

## 2022-02-17 DIAGNOSIS — R56.9 SEIZURE: ICD-10-CM

## 2022-02-17 DIAGNOSIS — D64.9 ANEMIA, UNSPECIFIED TYPE: ICD-10-CM

## 2022-02-17 LAB
BASOPHILS # BLD AUTO: 0.04 K/UL (ref 0–0.2)
BASOPHILS NFR BLD: 0.8 % (ref 0–1.9)
DIFFERENTIAL METHOD: ABNORMAL
EOSINOPHIL # BLD AUTO: 0.2 K/UL (ref 0–0.5)
EOSINOPHIL NFR BLD: 3.5 % (ref 0–8)
ERYTHROCYTE [DISTWIDTH] IN BLOOD BY AUTOMATED COUNT: 12.6 % (ref 11.5–14.5)
FERRITIN SERPL-MCNC: 103 NG/ML (ref 20–300)
FOLATE SERPL-MCNC: 8.6 NG/ML (ref 4–24)
HCT VFR BLD AUTO: 39.3 % (ref 40–54)
HGB BLD-MCNC: 12.6 G/DL (ref 14–18)
IMM GRANULOCYTES # BLD AUTO: 0.01 K/UL (ref 0–0.04)
IMM GRANULOCYTES NFR BLD AUTO: 0.2 % (ref 0–0.5)
IRON SERPL-MCNC: 116 UG/DL (ref 45–160)
LYMPHOCYTES # BLD AUTO: 2.7 K/UL (ref 1–4.8)
LYMPHOCYTES NFR BLD: 57.1 % (ref 18–48)
MCH RBC QN AUTO: 32 PG (ref 27–31)
MCHC RBC AUTO-ENTMCNC: 32.1 G/DL (ref 32–36)
MCV RBC AUTO: 100 FL (ref 82–98)
MONOCYTES # BLD AUTO: 0.3 K/UL (ref 0.3–1)
MONOCYTES NFR BLD: 6.9 % (ref 4–15)
NEUTROPHILS # BLD AUTO: 1.5 K/UL (ref 1.8–7.7)
NEUTROPHILS NFR BLD: 31.5 % (ref 38–73)
NRBC BLD-RTO: 0 /100 WBC
PLATELET # BLD AUTO: 188 K/UL (ref 150–450)
PMV BLD AUTO: 12.4 FL (ref 9.2–12.9)
RBC # BLD AUTO: 3.94 M/UL (ref 4.6–6.2)
T4 FREE SERPL-MCNC: 0.89 NG/DL (ref 0.71–1.51)
TSH SERPL DL<=0.005 MIU/L-ACNC: 5.14 UIU/ML (ref 0.4–4)
VIT B12 SERPL-MCNC: 410 PG/ML (ref 210–950)
WBC # BLD AUTO: 4.8 K/UL (ref 3.9–12.7)

## 2022-02-17 PROCEDURE — 99999 PR PBB SHADOW E&M-EST. PATIENT-LVL IV: ICD-10-PCS | Mod: PBBFAC,,, | Performed by: INTERNAL MEDICINE

## 2022-02-17 PROCEDURE — 99214 PR OFFICE/OUTPT VISIT, EST, LEVL IV, 30-39 MIN: ICD-10-PCS | Mod: S$GLB,,, | Performed by: INTERNAL MEDICINE

## 2022-02-17 PROCEDURE — 82728 ASSAY OF FERRITIN: CPT | Performed by: INTERNAL MEDICINE

## 2022-02-17 PROCEDURE — 84439 ASSAY OF FREE THYROXINE: CPT | Performed by: INTERNAL MEDICINE

## 2022-02-17 PROCEDURE — 82746 ASSAY OF FOLIC ACID SERUM: CPT | Performed by: INTERNAL MEDICINE

## 2022-02-17 PROCEDURE — 83540 ASSAY OF IRON: CPT | Performed by: INTERNAL MEDICINE

## 2022-02-17 PROCEDURE — 85025 COMPLETE CBC W/AUTO DIFF WBC: CPT | Performed by: INTERNAL MEDICINE

## 2022-02-17 PROCEDURE — 82607 VITAMIN B-12: CPT | Performed by: INTERNAL MEDICINE

## 2022-02-17 PROCEDURE — 99999 PR PBB SHADOW E&M-EST. PATIENT-LVL IV: CPT | Mod: PBBFAC,,, | Performed by: INTERNAL MEDICINE

## 2022-02-17 PROCEDURE — 84443 ASSAY THYROID STIM HORMONE: CPT | Performed by: INTERNAL MEDICINE

## 2022-02-17 PROCEDURE — 36415 COLL VENOUS BLD VENIPUNCTURE: CPT | Mod: PO | Performed by: INTERNAL MEDICINE

## 2022-02-17 PROCEDURE — 99214 OFFICE O/P EST MOD 30 MIN: CPT | Mod: S$GLB,,, | Performed by: INTERNAL MEDICINE

## 2022-02-17 NOTE — PROGRESS NOTES
Subjective:       Patient ID: Luis Skaggs is a 36 y.o. male.    Chief Complaint: Seizures and Anemia    Seizures   This is a new problem. The current episode started more than 1 week ago. The problem has not changed since onset.Pertinent negatives include no confusion, no headaches, no speech difficulty, no visual disturbance, no sore throat, no chest pain, no cough, no nausea, no vomiting and no diarrhea. Characteristics do not include apnea. There has been no fever.   Anemia  Presents for initial visit. There has been no abdominal pain, bruising/bleeding easily, confusion, fever, light-headedness, pallor or palpitations. Past treatments include nothing.     Past Medical History:   Diagnosis Date    Anxiety 11/10/2016    Asthma     Duodenitis      Past Surgical History:   Procedure Laterality Date    COLONOSCOPY W/ BIOPSIES       Family History   Problem Relation Age of Onset    Hypertension Father      Social History     Socioeconomic History    Marital status: Single   Tobacco Use    Smoking status: Former Smoker     Packs/day: 1.00     Years: 3.00     Pack years: 3.00     Quit date: 2013     Years since quittin.0    Smokeless tobacco: Former User   Substance and Sexual Activity    Alcohol use: Yes    Drug use: No    Sexual activity: Yes     Partners: Female     Birth control/protection: Condom, OCP     Review of Systems   Constitutional: Negative for activity change, appetite change, chills, diaphoresis, fatigue, fever and unexpected weight change.   HENT: Negative for drooling, ear discharge, ear pain, facial swelling, hearing loss, mouth sores, nosebleeds, postnasal drip, rhinorrhea, sinus pressure, sneezing, sore throat, tinnitus, trouble swallowing and voice change.    Eyes: Negative for photophobia, redness and visual disturbance.   Respiratory: Negative for apnea, cough, choking, chest tightness, shortness of breath and wheezing.    Cardiovascular: Negative for chest pain,  palpitations and leg swelling.   Gastrointestinal: Negative for abdominal distention, abdominal pain, anal bleeding, blood in stool, constipation, diarrhea, nausea, rectal pain and vomiting.   Endocrine: Negative for cold intolerance, heat intolerance, polydipsia, polyphagia and polyuria.   Genitourinary: Negative for difficulty urinating, dysuria, enuresis, flank pain, frequency, genital sores, hematuria and urgency.   Musculoskeletal: Negative for arthralgias, back pain, gait problem, joint swelling, myalgias, neck pain and neck stiffness.   Skin: Negative for color change, pallor, rash and wound.   Allergic/Immunologic: Negative for food allergies and immunocompromised state.   Neurological: Negative for dizziness, tremors, seizures, syncope, facial asymmetry, speech difficulty, weakness, light-headedness, numbness and headaches.   Hematological: Negative for adenopathy. Does not bruise/bleed easily.   Psychiatric/Behavioral: Negative for agitation, behavioral problems, confusion, decreased concentration, dysphoric mood, hallucinations, self-injury, sleep disturbance and suicidal ideas. The patient is not nervous/anxious and is not hyperactive.        Objective:      Physical Exam  Vitals and nursing note reviewed.   Constitutional:       General: He is not in acute distress.     Appearance: Normal appearance. He is well-developed and well-nourished. He is not diaphoretic.   HENT:      Head: Normocephalic and atraumatic.      Mouth/Throat:      Pharynx: No oropharyngeal exudate.   Eyes:      General: No scleral icterus.     Pupils: Pupils are equal, round, and reactive to light.   Neck:      Thyroid: No thyromegaly.      Vascular: No carotid bruit or JVD.      Trachea: No tracheal deviation.   Cardiovascular:      Rate and Rhythm: Normal rate and regular rhythm.      Pulses: Intact distal pulses.      Heart sounds: Normal heart sounds.   Pulmonary:      Effort: Pulmonary effort is normal. No respiratory distress.       Breath sounds: Normal breath sounds. No wheezing or rales.   Chest:      Chest wall: No tenderness.   Abdominal:      General: Bowel sounds are normal. There is no distension.      Palpations: Abdomen is soft.      Tenderness: There is no abdominal tenderness. There is no guarding or rebound.   Musculoskeletal:         General: No tenderness or edema. Normal range of motion.      Cervical back: Normal range of motion and neck supple.   Lymphadenopathy:      Cervical: No cervical adenopathy.   Skin:     General: Skin is warm and dry.      Coloration: Skin is not pale.      Findings: No erythema or rash.   Neurological:      Mental Status: He is alert and oriented to person, place, and time.   Psychiatric:         Mood and Affect: Mood and affect normal.         Behavior: Behavior normal.         Thought Content: Thought content normal.         Judgment: Judgment normal.         CMP  Sodium   Date Value Ref Range Status   12/20/2021 142 136 - 145 mmol/L Final     Potassium   Date Value Ref Range Status   12/20/2021 4.3 3.5 - 5.1 mmol/L Final     Chloride   Date Value Ref Range Status   12/20/2021 106 95 - 110 mmol/L Final     CO2   Date Value Ref Range Status   12/20/2021 28 23 - 29 mmol/L Final     Glucose   Date Value Ref Range Status   12/20/2021 96 70 - 110 mg/dL Final     BUN   Date Value Ref Range Status   12/20/2021 14 6 - 20 mg/dL Final     Creatinine   Date Value Ref Range Status   12/20/2021 1.0 0.5 - 1.4 mg/dL Final     Calcium   Date Value Ref Range Status   12/20/2021 8.7 8.7 - 10.5 mg/dL Final     Total Protein   Date Value Ref Range Status   12/20/2021 6.6 6.0 - 8.4 g/dL Final     Albumin   Date Value Ref Range Status   12/20/2021 3.8 3.5 - 5.2 g/dL Final     Total Bilirubin   Date Value Ref Range Status   12/20/2021 0.3 0.1 - 1.0 mg/dL Final     Comment:     For infants and newborns, interpretation of results should be based  on gestational age, weight and in agreement with  clinical  observations.    Premature Infant recommended reference ranges:  Up to 24 hours.............<8.0 mg/dL  Up to 48 hours............<12.0 mg/dL  3-5 days..................<15.0 mg/dL  6-29 days.................<15.0 mg/dL       Alkaline Phosphatase   Date Value Ref Range Status   12/20/2021 103 55 - 135 U/L Final     AST   Date Value Ref Range Status   12/20/2021 22 10 - 40 U/L Final     ALT   Date Value Ref Range Status   12/20/2021 18 10 - 44 U/L Final     Anion Gap   Date Value Ref Range Status   12/20/2021 8 8 - 16 mmol/L Final     eGFR if    Date Value Ref Range Status   12/20/2021 >60 >60 mL/min/1.73 m^2 Final     eGFR if non    Date Value Ref Range Status   12/20/2021 >60 >60 mL/min/1.73 m^2 Final     Comment:     Calculation used to obtain the estimated glomerular filtration  rate (eGFR) is the CKD-EPI equation.        Lab Results   Component Value Date    WBC 5.86 12/20/2021    HGB 11.3 (L) 12/20/2021    HCT 34.5 (L) 12/20/2021    MCV 98 12/20/2021     12/20/2021     No results found for: CHOL  No results found for: HDL  No results found for: LDLCALC  No results found for: TRIG  No results found for: CHOLHDL  Lab Results   Component Value Date    TSH 1.258 08/20/2015     No results found for: LABA1C, HGBA1C  Assessment:       1. Seizure    2. Anemia, unspecified type        Plan:   Seizure------no recurrence-------------on keppra-------  -     Ambulatory referral/consult to Neurology; Future; Expected date: 02/24/2022    Anemia, unspecified type  -     CBC Auto Differential; Future; Expected date: 02/17/2022  -     Ferritin; Future; Expected date: 02/17/2022  -     Iron; Future; Expected date: 02/17/2022  -     Vitamin B12; Future; Expected date: 02/17/2022  -     Folate; Future; Expected date: 02/17/2022  -     TSH; Future; Expected date: 02/17/2022    As above-------------f/u 6 months----

## 2022-02-18 ENCOUNTER — TELEPHONE (OUTPATIENT)
Dept: FAMILY MEDICINE | Facility: CLINIC | Age: 37
End: 2022-02-18
Payer: COMMERCIAL

## 2022-02-18 ENCOUNTER — PATIENT MESSAGE (OUTPATIENT)
Dept: FAMILY MEDICINE | Facility: CLINIC | Age: 37
End: 2022-02-18
Payer: COMMERCIAL

## 2022-02-18 DIAGNOSIS — D64.9 ANEMIA, UNSPECIFIED TYPE: Primary | ICD-10-CM

## 2022-02-18 RX ORDER — LEVOTHYROXINE SODIUM 25 UG/1
25 TABLET ORAL
Qty: 30 TABLET | Refills: 11 | Status: SHIPPED | OUTPATIENT
Start: 2022-02-18 | End: 2023-02-18

## 2022-02-18 NOTE — TELEPHONE ENCOUNTER
Hematology consult for anemia and thyroid is low-------start levothyroxine 25 mcg a day and repeat tsh,cbc in 1 month---------

## 2022-02-18 NOTE — TELEPHONE ENCOUNTER
----- Message from Savana Miranda sent at 2/18/2022 11:48 AM CST -----  Contact: SHILPI YOST [[id number]]  .Type:  Patient Returning Call    Who Called: SHILPI YOST [[id number]]  Who Left Message for Patient: nurse   Does the patient know what this is regarding?:   Would the patient rather a call back or a response via [facility]?  Call   Best Call Back Number: [phone] (home)    Additional Information:

## 2022-08-04 ENCOUNTER — OFFICE VISIT (OUTPATIENT)
Dept: NEUROLOGY | Facility: CLINIC | Age: 37
End: 2022-08-04
Payer: COMMERCIAL

## 2022-08-04 ENCOUNTER — LAB VISIT (OUTPATIENT)
Dept: LAB | Facility: HOSPITAL | Age: 37
End: 2022-08-04
Attending: PSYCHIATRY & NEUROLOGY
Payer: COMMERCIAL

## 2022-08-04 VITALS
HEIGHT: 70 IN | RESPIRATION RATE: 16 BRPM | HEART RATE: 76 BPM | BODY MASS INDEX: 20.61 KG/M2 | OXYGEN SATURATION: 99 % | DIASTOLIC BLOOD PRESSURE: 74 MMHG | WEIGHT: 143.94 LBS | SYSTOLIC BLOOD PRESSURE: 119 MMHG

## 2022-08-04 DIAGNOSIS — R56.9 NEW ONSET SEIZURE: ICD-10-CM

## 2022-08-04 DIAGNOSIS — F19.21 PERSONAL HISTORY OF DRUG DEPENDENCE: ICD-10-CM

## 2022-08-04 DIAGNOSIS — R56.9 NEW ONSET SEIZURE: Primary | ICD-10-CM

## 2022-08-04 PROCEDURE — 99205 PR OFFICE/OUTPT VISIT, NEW, LEVL V, 60-74 MIN: ICD-10-PCS | Mod: S$GLB,,, | Performed by: PSYCHIATRY & NEUROLOGY

## 2022-08-04 PROCEDURE — 99417 PROLNG OP E/M EACH 15 MIN: CPT | Mod: S$GLB,,, | Performed by: PSYCHIATRY & NEUROLOGY

## 2022-08-04 PROCEDURE — 99999 PR PBB SHADOW E&M-EST. PATIENT-LVL IV: ICD-10-PCS | Mod: PBBFAC,,, | Performed by: PSYCHIATRY & NEUROLOGY

## 2022-08-04 PROCEDURE — 99417 PR PROLONGED SVC, OUTPT, W/WO DIRECT PT CONTACT,  EA ADDTL 15 MIN: ICD-10-PCS | Mod: S$GLB,,, | Performed by: PSYCHIATRY & NEUROLOGY

## 2022-08-04 PROCEDURE — 80177 DRUG SCRN QUAN LEVETIRACETAM: CPT | Performed by: PSYCHIATRY & NEUROLOGY

## 2022-08-04 PROCEDURE — 99205 OFFICE O/P NEW HI 60 MIN: CPT | Mod: S$GLB,,, | Performed by: PSYCHIATRY & NEUROLOGY

## 2022-08-04 PROCEDURE — 99999 PR PBB SHADOW E&M-EST. PATIENT-LVL IV: CPT | Mod: PBBFAC,,, | Performed by: PSYCHIATRY & NEUROLOGY

## 2022-08-04 PROCEDURE — 36415 COLL VENOUS BLD VENIPUNCTURE: CPT | Performed by: PSYCHIATRY & NEUROLOGY

## 2022-08-04 RX ORDER — LEVETIRACETAM 500 MG/1
500 TABLET ORAL 2 TIMES DAILY
Qty: 60 TABLET | Refills: 11 | Status: SHIPPED | OUTPATIENT
Start: 2022-08-04 | End: 2023-07-17 | Stop reason: SDUPTHER

## 2022-08-04 NOTE — PROGRESS NOTES
"Subjective:       Patient ID: Luis Skaggs is a 37 y.o. male.    Chief Complaint: Seizures          HPI       The patient is here for seizure evaluation.    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 The seizure lasts for 1 minute followed by 10-15 minutes of confusion. Was evaluated in the ED and diagnosed as "syncope". On 12- he had another identical seizure and was prescribed  mg BID. States that he is compliant and has not had any event since 12-. No history of TBI, Meningitis, Stroke. No family history of epilepsy. He admitted to addition to Kratom for almost 6 years and changed formulation to powder in 2021 (quit in ).          Review of Systems   Constitutional: Negative for appetite change and fatigue.   HENT: Negative for hearing loss and tinnitus.    Eyes: Negative for photophobia and visual disturbance.   Respiratory: Negative for apnea and shortness of breath.    Cardiovascular: Negative for chest pain and palpitations.   Gastrointestinal: Negative for nausea and vomiting.   Endocrine: Negative for cold intolerance and heat intolerance.   Genitourinary: Negative for difficulty urinating and urgency.   Musculoskeletal: Negative for arthralgias, back pain, gait problem, joint swelling, myalgias, neck pain and neck stiffness.   Skin: Negative for color change and rash.   Allergic/Immunologic: Negative for environmental allergies and immunocompromised state.   Neurological: Positive for seizures. Negative for dizziness, tremors, syncope, facial asymmetry, speech difficulty, weakness, light-headedness, numbness and headaches.   Hematological: Negative for adenopathy. Does not bruise/bleed easily.   Psychiatric/Behavioral: Positive for " dysphoric mood. Negative for agitation, behavioral problems, confusion, decreased concentration, hallucinations, self-injury, sleep disturbance and suicidal ideas. The patient is nervous/anxious. The patient is not hyperactive.                  Current Outpatient Medications:     levothyroxine (SYNTHROID) 25 MCG tablet, Take 1 tablet (25 mcg total) by mouth before breakfast., Disp: 30 tablet, Rfl: 11    levETIRAcetam (KEPPRA) 500 MG Tab, Take 1 tablet (500 mg total) by mouth 2 (two) times daily., Disp: 60 tablet, Rfl: 11  Past Medical History:   Diagnosis Date    Anxiety 11/10/2016    Asthma     Duodenitis      Past Surgical History:   Procedure Laterality Date    COLONOSCOPY W/ BIOPSIES       Social History     Socioeconomic History    Marital status: Single   Tobacco Use    Smoking status: Former Smoker     Packs/day: 1.00     Years: 3.00     Pack years: 3.00     Quit date: 2013     Years since quittin.5    Smokeless tobacco: Former User   Substance and Sexual Activity    Alcohol use: Yes    Drug use: No    Sexual activity: Yes     Partners: Female     Birth control/protection: Condom, OCP             Past/Current Medical/Surgical History, Past/Current Social History, Past/Current Family History and Past/Current Medications were reviewed in detail.        Objective:           VITAL SIGNS WERE REVIEWED      GENERAL APPEARANCE:     The patient looks anxious, restless and irritable.    BMI 20.66    No signs of respiratory distress.    Normal breathing pattern.    No dysmorphic features    Normal eye contact.     GENERAL MEDICAL EXAM:    HEENT:  Head is atraumatic normocephalic. Fundoscopic (Ophthalmoscopic) exam showed no disc edema.      Neck and Axillae: No JVD. No visible lesions.    Cardiopulmonary: No cyanosis. No tachypnea. Normal respiratory effort.    Gastrointestinal/Urogenital:  No jaundice. No stomas or lesions. No visible hernias. No catheters.     Skin, Hair and Nails: No pathognonomic skin  rash. No neurofibromatosis. No visible lesions.No stigmata of autoimmune disease. No clubbing.    Limbs: No varicose veins. No visible swelling.    Muskoskeletal: No visible deformities.No visible lesions.           Neurologic Exam     Mental Status   Oriented to person, place, and time.   Follows 3 step commands.   Attention: normal. Concentration: normal.   Speech: speech is normal   Level of consciousness: alert  Able to name object. Able to repeat. Normal comprehension.     Cranial Nerves   Cranial nerves II through XII intact.     CN II   Visual fields full to confrontation.   Visual acuity: normal  Right visual field deficit: none  Left visual field deficit: none     CN III, IV, VI   Pupils are equal, round, and reactive to light.  Extraocular motions are normal.   Right pupil: Size: 2 mm. Shape: regular. Reactivity: brisk. Consensual response: intact. Accommodation: intact.   Left pupil: Size: 2 mm. Shape: regular. Reactivity: brisk. Consensual response: intact. Accommodation: intact.   CN III: no CN III palsy  CN VI: no CN VI palsy  Nystagmus: none   Diplopia: none  Ophthalmoparesis: none  Upgaze: normal  Downgaze: normal  Conjugate gaze: present  Vestibulo-ocular reflex: present    CN V   Facial sensation intact.   Right facial sensation deficit: none  Left facial sensation deficit: none  Jaw jerk: normal    CN VII   Facial expression full, symmetric.   Right facial weakness: none  Left facial weakness: none    CN VIII   CN VIII normal.   Hearing: intact    CN IX, X   CN IX normal.   CN X normal.   Palate: symmetric    CN XI   CN XI normal.   Right sternocleidomastoid strength: normal  Left sternocleidomastoid strength: normal  Right trapezius strength: normal  Left trapezius strength: normal    CN XII   CN XII normal.   Tongue: not atrophic  Fasciculations: absent  Tongue deviation: none    Motor Exam   Muscle bulk: normal  Overall muscle tone: normal  Right arm tone: normal  Left arm tone: normal  Right  arm pronator drift: absent  Left arm pronator drift: absent  Right leg tone: normal  Left leg tone: normal    Strength   Strength 5/5 throughout.   Right neck flexion: 5/5  Left neck flexion: 5/5  Right neck extension: 5/5  Left neck extension: 5/5  Right deltoid: 5/5  Left deltoid: 5/5  Right biceps: 5/5  Left biceps: 5/5  Right triceps: 5/5  Left triceps: 5/5  Right wrist flexion: 5/5  Left wrist flexion: 5/5  Right wrist extension: 5/5  Left wrist extension: 5/5  Right interossei: 5/5  Left interossei: 5/5  Right iliopsoas: 5/5  Left iliopsoas: 5/5  Right quadriceps: 5/5  Left quadriceps: 5/5  Right hamstrin/5  Left hamstrin/5  Right glutei: 5/5  Left glutei: 5/5  Right anterior tibial: 5/5  Left anterior tibial: 5/5  Right posterior tibial: 5/5  Left posterior tibial: 5/5  Right peroneal: 5/5  Left peroneal: 5/5  Right gastroc: 5/5  Left gastroc: 5/5    Sensory Exam   Light touch normal.   Right arm light touch: normal  Left arm light touch: normal  Right leg light touch: normal  Left leg light touch: normal  Vibration normal.   Right arm vibration: normal  Left arm vibration: normal  Right leg vibration: normal  Left leg vibration: normal  Proprioception normal.   Right arm proprioception: normal  Left arm proprioception: normal  Right leg proprioception: normal  Left leg proprioception: normal  Pinprick normal.   Right arm pinprick: normal  Left arm pinprick: normal  Right leg pinprick: normal  Left leg pinprick: normal  Graphesthesia: normal  Stereognosis: normal    Gait, Coordination, and Reflexes     Gait  Gait: normal    Coordination   Romberg: negative  Finger to nose coordination: normal  Heel to shin coordination: normal  Tandem walking coordination: normal    Tremor   Resting tremor: absent  Intention tremor: absent  Action tremor: absent    Reflexes   Right brachioradialis: 2+  Left brachioradialis: 2+  Right biceps: 2+  Left biceps: 2+  Right triceps: 2+  Left triceps: 2+  Right patellar:  2+  Left patellar: 2+  Right achilles: 2+  Left achilles: 2+  Right plantar: normal  Left plantar: normal  Right Orozco: absent  Left Orozco: absent  Right ankle clonus: absent  Left ankle clonus: absent  Right pendular knee jerk: absent  Left pendular knee jerk: absent      Lab Results   Component Value Date    WBC 4.80 02/17/2022    HGB 12.6 (L) 02/17/2022    HCT 39.3 (L) 02/17/2022     (H) 02/17/2022     02/17/2022     Sodium   Date Value Ref Range Status   12/20/2021 142 136 - 145 mmol/L Final     Potassium   Date Value Ref Range Status   12/20/2021 4.3 3.5 - 5.1 mmol/L Final     Chloride   Date Value Ref Range Status   12/20/2021 106 95 - 110 mmol/L Final     CO2   Date Value Ref Range Status   12/20/2021 28 23 - 29 mmol/L Final     Glucose   Date Value Ref Range Status   12/20/2021 96 70 - 110 mg/dL Final     BUN   Date Value Ref Range Status   12/20/2021 14 6 - 20 mg/dL Final     Creatinine   Date Value Ref Range Status   12/20/2021 1.0 0.5 - 1.4 mg/dL Final     Calcium   Date Value Ref Range Status   12/20/2021 8.7 8.7 - 10.5 mg/dL Final     Total Protein   Date Value Ref Range Status   12/20/2021 6.6 6.0 - 8.4 g/dL Final     Albumin   Date Value Ref Range Status   12/20/2021 3.8 3.5 - 5.2 g/dL Final     Total Bilirubin   Date Value Ref Range Status   12/20/2021 0.3 0.1 - 1.0 mg/dL Final     Comment:     For infants and newborns, interpretation of results should be based  on gestational age, weight and in agreement with clinical  observations.    Premature Infant recommended reference ranges:  Up to 24 hours.............<8.0 mg/dL  Up to 48 hours............<12.0 mg/dL  3-5 days..................<15.0 mg/dL  6-29 days.................<15.0 mg/dL       Alkaline Phosphatase   Date Value Ref Range Status   12/20/2021 103 55 - 135 U/L Final     AST   Date Value Ref Range Status   12/20/2021 22 10 - 40 U/L Final     ALT   Date Value Ref Range Status   12/20/2021 18 10 - 44 U/L Final     Anion Gap  "  Date Value Ref Range Status   12/20/2021 8 8 - 16 mmol/L Final     eGFR if    Date Value Ref Range Status   12/20/2021 >60 >60 mL/min/1.73 m^2 Final     eGFR if non    Date Value Ref Range Status   12/20/2021 >60 >60 mL/min/1.73 m^2 Final     Comment:     Calculation used to obtain the estimated glomerular filtration  rate (eGFR) is the CKD-EPI equation.        Lab Results   Component Value Date    CNSKTYLW31 410 02/17/2022     Lab Results   Component Value Date    TSH 5.143 (H) 02/17/2022    FREET4 0.89 02/17/2022 11-08-20221, 12-    CTH NL    UDS THC        08-    UDS -ve       08-    EEG A/S NL       AED MONITORING      AED: LEV  RANGE: 03-60 Dose    DATE LEVEL    08-  10.7  500 mg BID                                        Reviewed the neuroimaging independently       Assessment:       1. New onset seizure    2. Personal history of drug dependence        Plan:               NEW ONSET GRAND MAL SEIZURES(GENERALIZED TONIC-CLONIC SEIZURES-GTC) 11-, 12-     CHRONIC USE OF KRATOM            We had a long discussion about the possibility of Kratom-induced seizures vs. Kratom-induced breakthrough seizures with epilepsy. I explained to him that it is extremely hard to determine which is which without detailed evaluation, longitudinal follow up and withdrawal/challenge risky testing.      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 I asked him to allow us to finish his evaluation and explained to him that medical records information must be factual. He agreed to come back and get the evaluation completed.     EEG to evaluate for epileptiform discharges followed by AEEG.      Brain MRI " WO to evaluate for epileptogenic lesions.    The patient has been seizure-free for >6 months, compliant with regimen with therapeutic AED level. The patient meets the legal criteria to resume driving (NOT COMMERCIAL DRIVING). I explained to the patient that from a medical standpoint seizure-freedom is defined differently ( 1 year or 3 times the duration between the last 2 seizures). In addition, I explained to the patient that the risk of breakthrough seizures will ALWAYS be there. I instructed the patient to avoid alcohol, get enough sleep and be complaint with AED regimen. I instructed the patient to avoid driving if AED was skipped, if drank alcohol, sleep-deprived or not feeling well. The patient verbalized full understanding.     The patient was encouraged to watch for full traditional seizure precautions which include but not limited to being careful with driving, biking, high altitudes (ladders, escalators, rock climbing, mountain climbing, skiing, aden diving, moderate to difficult hiking), proximity to fire or fire source, proximity to body of water or swimming alone, operating heavy and potentially risky machinery, using sharp objects, using exercise machines like treadmill and weight lifting. Walking while accompanied on soft surfaces like grass is preferable.The patient was encouraged to shower (without accumulation of water) instead of taking a bath if unsupervised. The patient was made aware that these precautions are especially important during concurrent illnesses, fever, infections, vomiting, changing medications and running out of anti-seizure medications. Instructed the patient to avoid night shifts, sleep deprivation and alcohol or recreational drug use as adequate sleep and avoidance of alcohol/drugs are very important measures to assure good seizure control. The patient was also advised to be careful while taking care for young children without company. The patient is advised to pad the side  rails with pillows and blankets if applicable.I strongly recommended lowering the bed to the floor level to decrease the risk of falls during nocturnal seizures that occur during sleep. I also instructed the patient to be very careful with safety sensitive duties. In general, any activity that requires full awareness and would result in serious injury to self and others if a seizure takes place should be approached very cautiously despite good seizure control.       Continue Levetiracetam/Keppra- mg BID. Check LEV level today in addition to UDS.     Discussed Cannabis due to increased public interest (The plant has many chemicals with various effects: CBD is antiepileptic, THC has mixed effect on epilepsy)      AVOID any substance that could lower seizure threshold including but not limited to:        ALCOHOL AND WITHDRAWAL      TRAMADOL.     MEPERIDINE (DEMEROL)     ALL STIMULANTS-ALL ADHD MEDICATIONS.      CLOZAPINE.      BUPROPION (WELLBUTRIN)     CIPROFLOXACIN.    CYCLOSPORINE.     METOCLOPRAMIDE (REGLAN).     TETRAHYDROCANNABINOL (THC)    KRATOM                         MEDICAL/SURGICAL COMORBIDITIES     All relevant medical comorbidities noted and managed by primary care physician and medical care team.          HEALTHY LIFESTYLE AND PREVENTATIVE CARE    The patient to adhere to the age-appropriate health maintenance guidelines including screening tests and vaccinations. The patient to adhere to  healthy lifestyle, optimal weight, exercise, healthy diet, good sleep hygiene and avoiding drugs including smoking, alcohol and recreational drugs.      RTC in 4 weeks         Brooke Tipton MD, FAAN    Attending Neurologist/Epileptologist         Diplomate, American Board of Psychiatry and Neurology    Diplomate, American Board of Clinical Neurophysiology     Fellow, American Academy of Neurology               I spent a total of 110 minutes on the day of the visit.  This includes face to face time and non-face to  face time preparing to see the patient (eg, review of tests), obtaining and/or reviewing separately obtained history, documenting clinical information in the electronic or other health record, independently interpreting results and communicating results to the patient/family/caregiver, or care coordinator.

## 2022-08-05 ENCOUNTER — PATIENT MESSAGE (OUTPATIENT)
Dept: NEUROLOGY | Facility: CLINIC | Age: 37
End: 2022-08-05
Payer: COMMERCIAL

## 2022-08-08 ENCOUNTER — TELEPHONE (OUTPATIENT)
Dept: NEUROLOGY | Facility: CLINIC | Age: 37
End: 2022-08-08
Payer: COMMERCIAL

## 2022-08-08 LAB — LEVETIRACETAM SERPL-MCNC: 10.7 UG/ML (ref 3–60)

## 2022-08-08 NOTE — TELEPHONE ENCOUNTER
----- Message from Ysabel Mckay sent at 8/8/2022  8:39 AM CDT -----  Contact: Luis Smith would like a call back at [phone], Regards to getting EEG reschedule.    Thanks  Td

## 2022-08-17 ENCOUNTER — HOSPITAL ENCOUNTER (OUTPATIENT)
Dept: PULMONOLOGY | Facility: HOSPITAL | Age: 37
Discharge: HOME | End: 2022-08-17
Attending: PSYCHIATRY & NEUROLOGY
Payer: COMMERCIAL

## 2022-08-17 DIAGNOSIS — F19.21 PERSONAL HISTORY OF DRUG DEPENDENCE: ICD-10-CM

## 2022-08-17 DIAGNOSIS — R56.9 NEW ONSET SEIZURE: ICD-10-CM

## 2022-08-17 PROCEDURE — 95819 EEG AWAKE AND ASLEEP: CPT | Mod: 26,,, | Performed by: PSYCHIATRY & NEUROLOGY

## 2022-08-17 PROCEDURE — 95816 EEG AWAKE AND DROWSY: CPT

## 2022-08-17 PROCEDURE — 95819 PR EEG,W/AWAKE & ASLEEP RECORD: ICD-10-PCS | Mod: 26,,, | Performed by: PSYCHIATRY & NEUROLOGY

## 2022-08-18 ENCOUNTER — PATIENT MESSAGE (OUTPATIENT)
Dept: NEUROLOGY | Facility: CLINIC | Age: 37
End: 2022-08-18
Payer: COMMERCIAL

## 2022-08-18 ENCOUNTER — TELEPHONE (OUTPATIENT)
Dept: NEUROLOGY | Facility: CLINIC | Age: 37
End: 2022-08-18

## 2022-08-18 NOTE — TELEPHONE ENCOUNTER
----- Message from Brooke Tipton MD sent at 8/18/2022  8:08 AM CDT -----      EEG NL    Ordered AEEG

## 2022-08-18 NOTE — PROCEDURES
DATE EEG PERFORMED: 2022.        DATE EEG INTERPRETED: 2022.                     DURATION OF EE MINUTES.        LEVEL OF CONSCIOUSENESS    Awake and Sleep.         EEG BACKGROUND    The posterior dominant basic rhythm reaches 9-10 Hz, symmetric, reactive, well-modulated and well-sustained.         EEG CLASSIFICATION    Normal        IMPRESSION      The EEG is normal in the awake and sleep states.       There are no epileptiform discharges or lateralizing signs. No typical events were recorded. There is no electrographic evidence of seizure.There is no electrographic evidence of status epilepticus.         PLEASE NOTE THAT A NON-EPILEPTIFORM EEG DOES NOT RULE OUT EPILEPSY.        LIZETT SWANN MD, FAAN    Diplomate, American Board of Psychiatry and Neurology    Diplomate, American Board of Clinical Neurophysiology

## 2022-09-01 ENCOUNTER — OFFICE VISIT (OUTPATIENT)
Dept: NEUROLOGY | Facility: CLINIC | Age: 37
End: 2022-09-01
Payer: COMMERCIAL

## 2022-09-01 VITALS
RESPIRATION RATE: 16 BRPM | DIASTOLIC BLOOD PRESSURE: 68 MMHG | WEIGHT: 143.94 LBS | HEART RATE: 76 BPM | HEIGHT: 70 IN | BODY MASS INDEX: 20.61 KG/M2 | SYSTOLIC BLOOD PRESSURE: 113 MMHG | OXYGEN SATURATION: 99 %

## 2022-09-01 DIAGNOSIS — F19.21 PERSONAL HISTORY OF DRUG DEPENDENCE: ICD-10-CM

## 2022-09-01 DIAGNOSIS — R56.9 NEW ONSET SEIZURE: Primary | ICD-10-CM

## 2022-09-01 PROCEDURE — 99999 PR PBB SHADOW E&M-EST. PATIENT-LVL IV: CPT | Mod: PBBFAC,,, | Performed by: NURSE PRACTITIONER

## 2022-09-01 PROCEDURE — 99213 PR OFFICE/OUTPT VISIT, EST, LEVL III, 20-29 MIN: ICD-10-PCS | Mod: S$GLB,,, | Performed by: NURSE PRACTITIONER

## 2022-09-01 PROCEDURE — 99213 OFFICE O/P EST LOW 20 MIN: CPT | Mod: S$GLB,,, | Performed by: NURSE PRACTITIONER

## 2022-09-01 PROCEDURE — 99999 PR PBB SHADOW E&M-EST. PATIENT-LVL IV: ICD-10-PCS | Mod: PBBFAC,,, | Performed by: NURSE PRACTITIONER

## 2022-09-01 NOTE — PROGRESS NOTES
"Subjective:       Patient ID: Luis Skaggs is a 37 y.o. male.    Chief Complaint: Seizures    HPI     BACKGROUND    The patient is here for seizure evaluation.    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 The seizure lasts for 1 minute followed by 10-15 minutes of confusion. Was evaluated in the ED and diagnosed as "syncope". On 12- he had another identical seizure and was prescribed  mg BID. States that he is compliant and has not had any event since 12-. No history of TBI, Meningitis, Stroke. No family history of epilepsy. He admitted to addition to Kratom for almost 6 years and changed formulation to powder in 2021 (quit in ).      Interval History 9-1-2022: Patient established with Dr. Tipton, new to me. Patient presents to follow up appointment unaccompanied. Patient states he continues to take  mg BID without adverse effects. States he is complaint with medication. Has not had an event since 12-. Believes event was provoked by Kratom and GBP use. States he was taking GBP (unsure of dose) daily at the time of the event. Has brain MRI scheduled for 9-. Unable to complete AEEG at this time inability to be at home for 3-5 days. 08- LEV level 10.7 NL, UDS -ve. 08- EEG A/S NL         Review of Systems   Constitutional:  Negative for appetite change and fatigue.   HENT:  Negative for hearing loss and tinnitus.    Eyes:  Negative for photophobia and visual disturbance.   Respiratory:  Negative for apnea and shortness of breath.    Cardiovascular:  Negative for chest pain and palpitations.   Gastrointestinal:  Negative for nausea and vomiting.   Endocrine: Negative for cold intolerance and heat intolerance. "   Genitourinary:  Negative for difficulty urinating and urgency.   Musculoskeletal:  Negative for arthralgias, back pain, gait problem, joint swelling, myalgias, neck pain and neck stiffness.   Skin:  Negative for color change and rash.   Allergic/Immunologic: Negative for environmental allergies and immunocompromised state.   Neurological:  Positive for seizures. Negative for dizziness, tremors, syncope, facial asymmetry, speech difficulty, weakness, light-headedness, numbness and headaches.   Hematological:  Negative for adenopathy. Does not bruise/bleed easily.   Psychiatric/Behavioral:  Positive for dysphoric mood. Negative for agitation, behavioral problems, confusion, decreased concentration, hallucinations, self-injury, sleep disturbance and suicidal ideas. The patient is nervous/anxious. The patient is not hyperactive.                Current Outpatient Medications:     levETIRAcetam (KEPPRA) 500 MG Tab, Take 1 tablet (500 mg total) by mouth 2 (two) times daily., Disp: 60 tablet, Rfl: 11    levothyroxine (SYNTHROID) 25 MCG tablet, Take 1 tablet (25 mcg total) by mouth before breakfast., Disp: 30 tablet, Rfl: 11  Past Medical History:   Diagnosis Date    Anxiety 11/10/2016    Asthma     Duodenitis      Past Surgical History:   Procedure Laterality Date    COLONOSCOPY W/ BIOPSIES       Social History     Socioeconomic History    Marital status: Single   Tobacco Use    Smoking status: Former     Packs/day: 1.00     Years: 3.00     Pack years: 3.00     Types: Cigarettes     Quit date: 2013     Years since quittin.6    Smokeless tobacco: Former   Substance and Sexual Activity    Alcohol use: Yes    Drug use: No    Sexual activity: Yes     Partners: Female     Birth control/protection: Condom, OCP             Past/Current Medical/Surgical History, Past/Current Social History, Past/Current Family History and Past/Current Medications were reviewed in detail.        Objective:     VITAL SIGNS WERE  REVIEWED      GENERAL APPEARANCE:     The patient looks anxious, restless and irritable.    BMI 20.66    No signs of respiratory distress.    Normal breathing pattern.    No dysmorphic features    Normal eye contact.     GENERAL MEDICAL EXAM:    HEENT:  Head is atraumatic normocephalic. Fundoscopic (Ophthalmoscopic) exam showed no disc edema.      Neck and Axillae: No JVD. No visible lesions.    Cardiopulmonary: No cyanosis. No tachypnea. Normal respiratory effort.    Gastrointestinal/Urogenital:  No jaundice. No stomas or lesions. No visible hernias. No catheters.     Skin, Hair and Nails: No pathognonomic skin rash. No neurofibromatosis. No visible lesions.No stigmata of autoimmune disease. No clubbing.    Limbs: No varicose veins. No visible swelling.    Muskoskeletal: No visible deformities.No visible lesions.           Neurologic Exam     Mental Status   Oriented to person, place, and time.   Follows 3 step commands.   Attention: normal. Concentration: normal.   Speech: speech is normal   Level of consciousness: alert  Able to name object. Able to repeat. Normal comprehension.     Cranial Nerves   Cranial nerves II through XII intact.     CN II   Visual fields full to confrontation.   Visual acuity: normal  Right visual field deficit: none  Left visual field deficit: none     CN III, IV, VI   Pupils are equal, round, and reactive to light.  Extraocular motions are normal.   Right pupil: Size: 2 mm. Shape: regular. Reactivity: brisk. Consensual response: intact. Accommodation: intact.   Left pupil: Size: 2 mm. Shape: regular. Reactivity: brisk. Consensual response: intact. Accommodation: intact.   CN III: no CN III palsy  CN VI: no CN VI palsy  Nystagmus: none   Diplopia: none  Ophthalmoparesis: none  Upgaze: normal  Downgaze: normal  Conjugate gaze: present  Vestibulo-ocular reflex: present    CN V   Facial sensation intact.   Right facial sensation deficit: none  Left facial sensation deficit: none    CN VII    Facial expression full, symmetric.   Right facial weakness: none  Left facial weakness: none    CN VIII   CN VIII normal.   Hearing: intact    CN IX, X   CN IX normal.   CN X normal.   Palate: symmetric    CN XI   CN XI normal.   Right sternocleidomastoid strength: normal  Left sternocleidomastoid strength: normal  Right trapezius strength: normal  Left trapezius strength: normal    CN XII   CN XII normal.   Tongue: not atrophic  Fasciculations: absent  Tongue deviation: none    Motor Exam   Muscle bulk: normal  Overall muscle tone: normal  Right arm tone: normal  Left arm tone: normal  Right arm pronator drift: absent  Left arm pronator drift: absent  Right leg tone: normal  Left leg tone: normal    Strength   Strength 5/5 throughout.   Right neck flexion: 5/5  Left neck flexion: 5/5  Right neck extension: 5/5  Left neck extension: 5/5  Right deltoid: 5/5  Left deltoid: 5/5  Right biceps: 5/5  Left biceps: 5/5  Right triceps: 5/5  Left triceps: 5/5  Right wrist flexion: 5/5  Left wrist flexion: 5/5  Right wrist extension: 5/5  Left wrist extension: 5/5  Right interossei: 5/5  Left interossei: 5/5  Right iliopsoas: 5/5  Left iliopsoas: 5/5  Right quadriceps: 5/5  Left quadriceps: 5/5  Right hamstrin/5  Left hamstrin/5  Right glutei: 5/5  Left glutei: 5/5  Right anterior tibial: 5/5  Left anterior tibial: 5/5  Right posterior tibial: 5/5  Left posterior tibial: 5/5  Right peroneal: 5/5  Left peroneal: 5/5  Right gastroc: 5/5  Left gastroc: 5/5    Sensory Exam   Light touch normal.   Right arm light touch: normal  Left arm light touch: normal  Right leg light touch: normal  Left leg light touch: normal  Vibration normal.   Right arm vibration: normal  Left arm vibration: normal  Right leg vibration: normal  Left leg vibration: normal  Proprioception normal.   Right arm proprioception: normal  Left arm proprioception: normal  Right leg proprioception: normal  Left leg proprioception: normal  Pinprick normal.    Right arm pinprick: normal  Left arm pinprick: normal  Right leg pinprick: normal  Left leg pinprick: normal  Graphesthesia: normal  Stereognosis: normal    Gait, Coordination, and Reflexes     Gait  Gait: normal    Coordination   Romberg: negative  Finger to nose coordination: normal  Heel to shin coordination: normal  Tandem walking coordination: normal    Tremor   Resting tremor: absent  Intention tremor: absent  Action tremor: left arm and right arm    Reflexes   Right brachioradialis: 2+  Left brachioradialis: 2+  Right biceps: 2+  Left biceps: 2+  Right triceps: 2+  Left triceps: 2+  Right patellar: 2+  Left patellar: 2+  Right achilles: 2+  Left achilles: 2+  Right plantar: normal  Left plantar: normal  Right Orozco: absent  Left Orozco: absent  Right ankle clonus: absent  Left ankle clonus: absent  Right pendular knee jerk: absent  Left pendular knee jerk: absent    Lab Results   Component Value Date    WBC 4.80 02/17/2022    HGB 12.6 (L) 02/17/2022    HCT 39.3 (L) 02/17/2022     (H) 02/17/2022     02/17/2022     Sodium   Date Value Ref Range Status   12/20/2021 142 136 - 145 mmol/L Final     Potassium   Date Value Ref Range Status   12/20/2021 4.3 3.5 - 5.1 mmol/L Final     Chloride   Date Value Ref Range Status   12/20/2021 106 95 - 110 mmol/L Final     CO2   Date Value Ref Range Status   12/20/2021 28 23 - 29 mmol/L Final     Glucose   Date Value Ref Range Status   12/20/2021 96 70 - 110 mg/dL Final     BUN   Date Value Ref Range Status   12/20/2021 14 6 - 20 mg/dL Final     Creatinine   Date Value Ref Range Status   12/20/2021 1.0 0.5 - 1.4 mg/dL Final     Calcium   Date Value Ref Range Status   12/20/2021 8.7 8.7 - 10.5 mg/dL Final     Total Protein   Date Value Ref Range Status   12/20/2021 6.6 6.0 - 8.4 g/dL Final     Albumin   Date Value Ref Range Status   12/20/2021 3.8 3.5 - 5.2 g/dL Final     Total Bilirubin   Date Value Ref Range Status   12/20/2021 0.3 0.1 - 1.0 mg/dL Final      Comment:     For infants and newborns, interpretation of results should be based  on gestational age, weight and in agreement with clinical  observations.    Premature Infant recommended reference ranges:  Up to 24 hours.............<8.0 mg/dL  Up to 48 hours............<12.0 mg/dL  3-5 days..................<15.0 mg/dL  6-29 days.................<15.0 mg/dL       Alkaline Phosphatase   Date Value Ref Range Status   12/20/2021 103 55 - 135 U/L Final     AST   Date Value Ref Range Status   12/20/2021 22 10 - 40 U/L Final     ALT   Date Value Ref Range Status   12/20/2021 18 10 - 44 U/L Final     Anion Gap   Date Value Ref Range Status   12/20/2021 8 8 - 16 mmol/L Final     eGFR if    Date Value Ref Range Status   12/20/2021 >60 >60 mL/min/1.73 m^2 Final     eGFR if non    Date Value Ref Range Status   12/20/2021 >60 >60 mL/min/1.73 m^2 Final     Comment:     Calculation used to obtain the estimated glomerular filtration  rate (eGFR) is the CKD-EPI equation.        Lab Results   Component Value Date    [id number] 410 02/17/2022     Lab Results   Component Value Date    TSH 5.143 (H) 02/17/2022    FREET4 0.89 02/17/2022 11-08-20221, 12-    [facility] NL    UDS THC        08-    UDS -ve       08-    EEG A/S NL       AED MONITORING      AED: LEV  RANGE: 03-60 Dose    DATE LEVEL    08-  10.7  500 mg BID                                        Reviewed the neuroimaging independently       Assessment:       1. New onset seizure    2. Personal history of drug dependence          Plan:         NEW ONSET GRAND MAL SEIZURES(GENERALIZED TONIC-CLONIC SEIZURES-GTC) 11-, 12-     CHRONIC USE OF KRATOM            We had a long discussion about the possibility of Kratom-induced seizures vs. Kratom-induced breakthrough seizures with epilepsy. I explained to him that it is extremely hard to determine which is which without detailed evaluation,  "longitudinal follow up and withdrawal/challenge risky testing.      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 I asked him to allow us to finish his evaluation and explained to him that medical records information must be factual. He agreed to come back and get the evaluation completed.     AEEG to evaluate for epileptiform discharges when patient has availability.     Brain MRI WO to evaluate for epileptogenic lesions.    The patient has been seizure-free for >6 months, compliant with regimen with therapeutic AED level. The patient meets the legal criteria to resume driving (NOT COMMERCIAL DRIVING). I explained to the patient that from a medical standpoint seizure-freedom is defined differently ( 1 year or 3 times the duration between the last 2 seizures). In addition, I explained to the patient that the risk of breakthrough seizures will ALWAYS be there. I instructed the patient to avoid alcohol, get enough sleep and be complaint with AED regimen. I instructed the patient to avoid driving if AED was skipped, if drank alcohol, sleep-deprived or not feeling well. The patient verbalized full understanding.     The patient was encouraged to watch for full traditional seizure precautions which include but not limited to being careful with driving, biking, high altitudes (ladders, escalators, rock climbing, mountain climbing, skiing, aden diving, moderate to difficult hiking), proximity to fire or fire source, proximity to body of water or swimming alone, operating heavy and potentially risky machinery, using sharp objects, using exercise machines like treadmill and weight lifting. Walking while accompanied on soft surfaces like grass is preferable.The patient was encouraged to shower " (without accumulation of water) instead of taking a bath if unsupervised. The patient was made aware that these precautions are especially important during concurrent illnesses, fever, infections, vomiting, changing medications and running out of anti-seizure medications. Instructed the patient to avoid night shifts, sleep deprivation and alcohol or recreational drug use as adequate sleep and avoidance of alcohol/drugs are very important measures to assure good seizure control. The patient was also advised to be careful while taking care for young children without company. The patient is advised to pad the side rails with pillows and blankets if applicable.I strongly recommended lowering the bed to the floor level to decrease the risk of falls during nocturnal seizures that occur during sleep. I also instructed the patient to be very careful with safety sensitive duties. In general, any activity that requires full awareness and would result in serious injury to self and others if a seizure takes place should be approached very cautiously despite good seizure control.       Continue Levetiracetam/Keppra- mg BID. Check LEV level annually and PRN    Discussed Cannabis due to increased public interest (The plant has many chemicals with various effects: CBD is antiepileptic, THC has mixed effect on epilepsy)      AVOID any substance that could lower seizure threshold including but not limited to:        ALCOHOL AND WITHDRAWAL      TRAMADOL.     MEPERIDINE (DEMEROL)     ALL STIMULANTS-ALL ADHD MEDICATIONS.      CLOZAPINE.      BUPROPION (WELLBUTRIN)     CIPROFLOXACIN.    CYCLOSPORINE.     METOCLOPRAMIDE (REGLAN).     TETRAHYDROCANNABINOL (THC)    KRATOM                         MEDICAL/SURGICAL COMORBIDITIES     All relevant medical comorbidities noted and managed by primary care physician and medical care team.          HEALTHY LIFESTYLE AND PREVENTATIVE CARE    The patient to adhere to the age-appropriate health  maintenance guidelines including screening tests and vaccinations. The patient to adhere to  healthy lifestyle, optimal weight, exercise, healthy diet, good sleep hygiene and avoiding drugs including smoking, alcohol and recreational drugs.      RTC in 3 months          Nila Quintero, MSN, NP    Collaborating Provider: Brooke Tipton MD, FAAN Neurologist/Epileptologist                   I spent a total of 25 minutes on the day of the visit.  This includes face to face time and non-face to face time preparing to see the patient (eg, review of tests), obtaining and/or reviewing separately obtained history, documenting clinical information in the electronic or other health record, independently interpreting results and communicating results to the patient/family/caregiver, or care coordinator.

## 2022-09-15 ENCOUNTER — TELEPHONE (OUTPATIENT)
Dept: NEUROLOGY | Facility: CLINIC | Age: 37
End: 2022-09-15
Payer: COMMERCIAL

## 2022-09-15 DIAGNOSIS — R56.9 SEIZURE: Primary | ICD-10-CM

## 2022-09-15 NOTE — TELEPHONE ENCOUNTER
----- Message from RT Lydia sent at 9/15/2022  2:40 PM CDT -----  Regarding: Claustrophobic  Patient was unable to perform his MRI due to claustro.  He was sweating and his hands were shaking.

## 2023-07-17 DIAGNOSIS — R56.9 NEW ONSET SEIZURE: ICD-10-CM

## 2023-07-17 RX ORDER — LEVETIRACETAM 500 MG/1
500 TABLET ORAL 2 TIMES DAILY
Qty: 180 TABLET | Refills: 1 | Status: SHIPPED | OUTPATIENT
Start: 2023-07-17 | End: 2024-02-23 | Stop reason: SDUPTHER

## 2024-02-22 DIAGNOSIS — R56.9 NEW ONSET SEIZURE: ICD-10-CM

## 2024-02-22 RX ORDER — LEVETIRACETAM 500 MG/1
500 TABLET ORAL 2 TIMES DAILY
Qty: 180 TABLET | Refills: 0 | OUTPATIENT
Start: 2024-02-22

## 2024-02-22 RX ORDER — LEVOTHYROXINE SODIUM 25 UG/1
25 TABLET ORAL
Qty: 30 TABLET | Refills: 11 | OUTPATIENT
Start: 2024-02-22 | End: 2025-02-21

## 2024-02-22 NOTE — TELEPHONE ENCOUNTER
Returned pt call advised him that medication was denied  because he need an appt . Lov was September 2022. He asked does it show in donny . Advised him it does he replied ok if can't get it I can't get it and disconnected the line .

## 2024-02-22 NOTE — TELEPHONE ENCOUNTER
----- Message from Chanel Cantu sent at 2/22/2024  4:47 PM CST -----  Regarding: rx refill  Type:  RX Refill Request    Who Called: mom / Libby  Refill or New Rx:refill  RX Name and Strength:KEPPRA) 500 MG  How is the patient currently taking it? (ex. 1XDay):  Is this a 30 day or 90 day RX:  Preferred Pharmacy with phone number:[address] [address] [facility]   Phone: [phone]  Fax: [phone]        Local or Mail Order:local  Ordering Provider:  Would the patient rather a call back or a response via [facility]? Call back  Best Call Back Number: [phone]  Additional Information: Pt is almost out / seizure/ requested twice

## 2024-02-23 RX ORDER — LEVETIRACETAM 500 MG/1
500 TABLET ORAL 2 TIMES DAILY
Qty: 60 TABLET | Refills: 5 | Status: SHIPPED | OUTPATIENT
Start: 2024-02-23

## 2024-02-23 NOTE — TELEPHONE ENCOUNTER
----- Message from Ysabel Mckay sent at 2/23/2024  7:06 AM CST -----  Contact: Libby/mom  Type:  RX Refill Request    Who Called:  Lbiby  Refill or New Rx: refill   RX Name and Strength: Levetiracetam (KEPPRA) 500 MG Tab  How is the patient currently taking it? (ex. 1XDay): 2xday   Is this a 30 day or 90 day RX: 90  Preferred Pharmacy with phone number:   [facility] [id number] [facility] [place] - [address] [facility]  [address] [facility] [address]  Phone: [phone] Fax: [phone]  Local or Mail Order: Local   Ordering Provider: Dr. Tipton  Would the patient rather a call back or a response via [facility]? Call back   Best Call Back Number: Please call her at [phone]  Additional Information:

## 2024-02-23 NOTE — TELEPHONE ENCOUNTER
Advised mom  that he must come to appt medication has been filled until appt . She stated she will drag him here if she have to .

## 2024-08-26 ENCOUNTER — OFFICE VISIT (OUTPATIENT)
Dept: NEUROLOGY | Facility: CLINIC | Age: 39
End: 2024-08-26

## 2024-08-26 ENCOUNTER — LAB VISIT (OUTPATIENT)
Dept: LAB | Facility: HOSPITAL | Age: 39
End: 2024-08-26
Attending: NURSE PRACTITIONER

## 2024-08-26 VITALS
HEIGHT: 70 IN | WEIGHT: 147.5 LBS | DIASTOLIC BLOOD PRESSURE: 81 MMHG | BODY MASS INDEX: 21.11 KG/M2 | SYSTOLIC BLOOD PRESSURE: 138 MMHG | RESPIRATION RATE: 16 BRPM | HEART RATE: 86 BPM

## 2024-08-26 DIAGNOSIS — L56.8 DRUG-INDUCED PHOTOSENSITIVITY: Chronic | ICD-10-CM

## 2024-08-26 DIAGNOSIS — T50.905A DRUG-INDUCED PHOTOSENSITIVITY: Chronic | ICD-10-CM

## 2024-08-26 DIAGNOSIS — L81.9 HYPERPIGMENTATION OF SKIN: Chronic | ICD-10-CM

## 2024-08-26 DIAGNOSIS — R56.9 NEW ONSET SEIZURE: ICD-10-CM

## 2024-08-26 DIAGNOSIS — R56.9 SEIZURE: Primary | ICD-10-CM

## 2024-08-26 PROCEDURE — 80177 DRUG SCRN QUAN LEVETIRACETAM: CPT | Performed by: NURSE PRACTITIONER

## 2024-08-26 PROCEDURE — 99214 OFFICE O/P EST MOD 30 MIN: CPT | Mod: PBBFAC | Performed by: NURSE PRACTITIONER

## 2024-08-26 PROCEDURE — 99999 PR PBB SHADOW E&M-EST. PATIENT-LVL IV: CPT | Mod: PBBFAC,,, | Performed by: NURSE PRACTITIONER

## 2024-08-26 PROCEDURE — 99214 OFFICE O/P EST MOD 30 MIN: CPT | Mod: S$PBB,,, | Performed by: NURSE PRACTITIONER

## 2024-08-26 PROCEDURE — 36415 COLL VENOUS BLD VENIPUNCTURE: CPT | Performed by: NURSE PRACTITIONER

## 2024-08-26 RX ORDER — LEVETIRACETAM 500 MG/1
500 TABLET ORAL 2 TIMES DAILY
Qty: 180 TABLET | Refills: 3 | Status: SHIPPED | OUTPATIENT
Start: 2024-08-26 | End: 2025-08-26

## 2024-08-26 NOTE — PROGRESS NOTES
"Subjective:       Patient ID: Luis Skaggs is a 39 y.o. male.    Chief Complaint: Seizures      HPI     BACKGROUND    The patient is here for seizure evaluation.    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 The seizure lasts for 1 minute followed by 10-15 minutes of confusion. Was evaluated in the ED and diagnosed as "syncope". On 12- he had another identical seizure and was prescribed  mg BID. States that he is compliant and has not had any event since 12-. No history of TBI, Meningitis, Stroke. No family history of epilepsy. He admitted to addition to Kratom for almost 6 years and changed formulation to powder in 2021 (quit in ).   9-1-2022: Patient established with Dr. Tipton, new to me. Patient presents to follow up appointment unaccompanied. Patient states he continues to take  mg BID without adverse effects. States he is complaint with medication. Has not had an event since 12-. Believes event was provoked by Kratom and GBP use. States he was taking GBP (unsure of dose) daily at the time of the event. Has brain MRI scheduled for 9-. Unable to complete AEEG at this time inability to be at home for 3-5 days. 08- LEV level 10.7 NL, UDS -ve. 08- EEG A/S NL     Interval History 08-: Patient established with Dr. Tipton, new to me. Patient presents to follow up appointment unaccompanied. Patient states he continues to take  mg BID without adverse effects.Patient reports compliance with medication. No seizures. Last seizure event 12-. Patient  continue to be addicted to Kratom. The patient has bluish-  purple hyper skin pigmentation related to Kratom and sun exposure. MRI BRAIN not completed. "  Unable to complete AEEG at this time inability to be at home for 3-5 days. 08- LEV level 10.7 NL.         Review of Systems   Constitutional:  Negative for appetite change and fatigue.   HENT:  Negative for hearing loss and tinnitus.    Eyes:  Negative for photophobia and visual disturbance.   Respiratory:  Negative for apnea and shortness of breath.    Cardiovascular:  Negative for chest pain and palpitations.   Gastrointestinal:  Negative for nausea and vomiting.   Endocrine: Negative for cold intolerance and heat intolerance.   Genitourinary:  Negative for difficulty urinating and urgency.   Musculoskeletal:  Negative for arthralgias, back pain, gait problem, joint swelling, myalgias, neck pain and neck stiffness.   Skin:  Positive for color change. Negative for rash.        bluish- purple hyper skin pigmentation related to Kratom and sun exposure.    Allergic/Immunologic: Negative for environmental allergies and immunocompromised state.   Neurological:  Positive for seizures. Negative for dizziness, tremors, syncope, facial asymmetry, speech difficulty, weakness, light-headedness, numbness and headaches.   Hematological:  Negative for adenopathy. Does not bruise/bleed easily.   Psychiatric/Behavioral:  Positive for dysphoric mood. Negative for agitation, behavioral problems, confusion, decreased concentration, hallucinations, self-injury, sleep disturbance and suicidal ideas. The patient is nervous/anxious. The patient is not hyperactive.                  Current Outpatient Medications:     levETIRAcetam (KEPPRA) 500 MG Tab, Take 1 tablet (500 mg total) by mouth 2 (two) times daily., Disp: 180 tablet, Rfl: 3    levothyroxine (SYNTHROID) 25 MCG tablet, Take 1 tablet (25 mcg total) by mouth before breakfast., Disp: 30 tablet, Rfl: 11  Past Medical History:   Diagnosis Date    Anxiety 11/10/2016    Asthma     Duodenitis      Past Surgical History:   Procedure Laterality Date    COLONOSCOPY W/ BIOPSIES        Social History     Socioeconomic History    Marital status: Single   Tobacco Use    Smoking status: Former     Current packs/day: 0.00     Average packs/day: 1 pack/day for 3.0 years (3.0 ttl pk-yrs)     Types: Cigarettes     Start date: 2010     Quit date: 2013     Years since quittin.6    Smokeless tobacco: Former   Substance and Sexual Activity    Alcohol use: Yes    Drug use: No    Sexual activity: Yes     Partners: Female     Birth control/protection: Condom, OCP             Past/Current Medical/Surgical History, Past/Current Social History, Past/Current Family History and Past/Current Medications were reviewed in detail.        Objective:     VITAL SIGNS WERE REVIEWED      GENERAL APPEARANCE:     The patient looks anxious, restless and irritable.    BMI 21.16 kg     No signs of respiratory distress.    Normal breathing pattern.    No dysmorphic features    Normal eye contact.     GENERAL MEDICAL EXAM:    HEENT:  Head is atraumatic normocephalic. Fundoscopic (Ophthalmoscopic) exam showed no disc edema.      Neck and Axillae: No JVD. No visible lesions.    Cardiopulmonary: No cyanosis. No tachypnea. Normal respiratory effort.    Gastrointestinal/Urogenital:  No jaundice. No stomas or lesions. No visible hernias. No catheters.     Skin, Hair and Nails: No pathognonomic skin rash. No neurofibromatosis. No visible lesions.No stigmata of autoimmune disease. No clubbing.    Limbs: No varicose veins. No visible swelling.    Muskoskeletal: No visible deformities.No visible lesions.           Neurologic Exam     Mental Status   Oriented to person, place, and time.   Follows 3 step commands.   Attention: normal. Concentration: normal.   Speech: speech is normal   Level of consciousness: alert  Able to name object. Able to repeat. Normal comprehension.   bluish-  purple hyper skin pigmentation related to Kratom and sun exposure.      Cranial Nerves   Cranial nerves II through XII intact.     CN II    Visual fields full to confrontation.   Visual acuity: normal  Right visual field deficit: none  Left visual field deficit: none     CN III, IV, VI   Pupils are equal, round, and reactive to light.  Extraocular motions are normal.   Right pupil: Size: 2 mm. Shape: regular. Reactivity: brisk. Consensual response: intact. Accommodation: intact.   Left pupil: Size: 2 mm. Shape: regular. Reactivity: brisk. Consensual response: intact. Accommodation: intact.   CN III: no CN III palsy  CN VI: no CN VI palsy  Nystagmus: none   Diplopia: none  Ophthalmoparesis: none  Upgaze: normal  Downgaze: normal  Conjugate gaze: present  Vestibulo-ocular reflex: present    CN V   Facial sensation intact.   Right facial sensation deficit: none  Left facial sensation deficit: none    CN VII   Facial expression full, symmetric.   Right facial weakness: none  Left facial weakness: none    CN VIII   CN VIII normal.   Hearing: intact    CN IX, X   CN IX normal.   CN X normal.   Palate: symmetric    CN XI   CN XI normal.   Right sternocleidomastoid strength: normal  Left sternocleidomastoid strength: normal  Right trapezius strength: normal  Left trapezius strength: normal    CN XII   CN XII normal.   Tongue: not atrophic  Fasciculations: absent  Tongue deviation: none    Motor Exam   Muscle bulk: normal  Overall muscle tone: normal  Right arm tone: normal  Left arm tone: normal  Right arm pronator drift: absent  Left arm pronator drift: absent  Right leg tone: normal  Left leg tone: normal    Strength   Strength 5/5 throughout.   Right neck flexion: 5/5  Left neck flexion: 5/5  Right neck extension: 5/5  Left neck extension: 5/5  Right deltoid: 5/5  Left deltoid: 5/5  Right biceps: 5/5  Left biceps: 5/5  Right triceps: 5/5  Left triceps: 5/5  Right wrist flexion: 5/5  Left wrist flexion: 5/5  Right wrist extension: 5/5  Left wrist extension: 5/5  Right interossei: 5/5  Left interossei: 5/5  Right iliopsoas: 5/5  Left iliopsoas: 5/5  Right  quadriceps: 5/5  Left quadriceps: 5/5  Right hamstrin/5  Left hamstrin/5  Right glutei: 5/5  Left glutei: 5/5  Right anterior tibial: 5/5  Left anterior tibial: 5/5  Right posterior tibial: 5/5  Left posterior tibial: 5/5  Right peroneal: 5/5  Left peroneal: 5/5  Right gastroc: 5/5  Left gastroc: 5/5    Sensory Exam   Light touch normal.   Right arm light touch: normal  Left arm light touch: normal  Right leg light touch: normal  Left leg light touch: normal  Vibration normal.   Right arm vibration: normal  Left arm vibration: normal  Right leg vibration: normal  Left leg vibration: normal  Proprioception normal.   Right arm proprioception: normal  Left arm proprioception: normal  Right leg proprioception: normal  Left leg proprioception: normal  Pinprick normal.   Right arm pinprick: normal  Left arm pinprick: normal  Right leg pinprick: normal  Left leg pinprick: normal  Graphesthesia: normal  Stereognosis: normal    Gait, Coordination, and Reflexes     Gait  Gait: normal    Coordination   Romberg: negative  Finger to nose coordination: normal  Heel to shin coordination: normal  Tandem walking coordination: normal    Tremor   Resting tremor: absent  Intention tremor: absent  Action tremor: left arm and right arm    Reflexes   Right brachioradialis: 2+  Left brachioradialis: 2+  Right biceps: 2+  Left biceps: 2+  Right triceps: 2+  Left triceps: 2+  Right patellar: 2+  Left patellar: 2+  Right achilles: 2+  Left achilles: 2+  Right plantar: normal  Left plantar: normal  Right Orozco: absent  Left Orozco: absent  Right ankle clonus: absent  Left ankle clonus: absent  Right pendular knee jerk: absent  Left pendular knee jerk: absent      Lab Results   Component Value Date    WBC 4.80 2022    HGB 12.6 (L) 2022    HCT 39.3 (L) 2022     (H) 2022     2022     Sodium   Date Value Ref Range Status   2021 142 136 - 145 mmol/L Final     Potassium   Date Value Ref  Range Status   12/20/2021 4.3 3.5 - 5.1 mmol/L Final     Chloride   Date Value Ref Range Status   12/20/2021 106 95 - 110 mmol/L Final     CO2   Date Value Ref Range Status   12/20/2021 28 23 - 29 mmol/L Final     Glucose   Date Value Ref Range Status   12/20/2021 96 70 - 110 mg/dL Final     BUN   Date Value Ref Range Status   12/20/2021 14 6 - 20 mg/dL Final     Creatinine   Date Value Ref Range Status   12/20/2021 1.0 0.5 - 1.4 mg/dL Final     Calcium   Date Value Ref Range Status   12/20/2021 8.7 8.7 - 10.5 mg/dL Final     Total Protein   Date Value Ref Range Status   12/20/2021 6.6 6.0 - 8.4 g/dL Final     Albumin   Date Value Ref Range Status   12/20/2021 3.8 3.5 - 5.2 g/dL Final     Total Bilirubin   Date Value Ref Range Status   12/20/2021 0.3 0.1 - 1.0 mg/dL Final     Comment:     For infants and newborns, interpretation of results should be based  on gestational age, weight and in agreement with clinical  observations.    Premature Infant recommended reference ranges:  Up to 24 hours.............<8.0 mg/dL  Up to 48 hours............<12.0 mg/dL  3-5 days..................<15.0 mg/dL  6-29 days.................<15.0 mg/dL       Alkaline Phosphatase   Date Value Ref Range Status   12/20/2021 103 55 - 135 U/L Final     AST   Date Value Ref Range Status   12/20/2021 22 10 - 40 U/L Final     ALT   Date Value Ref Range Status   12/20/2021 18 10 - 44 U/L Final     Anion Gap   Date Value Ref Range Status   12/20/2021 8 8 - 16 mmol/L Final     eGFR if    Date Value Ref Range Status   12/20/2021 >60 >60 mL/min/1.73 m^2 Final     eGFR if non    Date Value Ref Range Status   12/20/2021 >60 >60 mL/min/1.73 m^2 Final     Comment:     Calculation used to obtain the estimated glomerular filtration  rate (eGFR) is the CKD-EPI equation.        Lab Results   Component Value Date    [id number] 410 02/17/2022     Lab Results   Component Value Date    TSH 5.143 (H) 02/17/2022    FREET4 0.89  "02/17/2022 11-08-20221, 12-    [facility] NL    UDS THC        08-    UDS -ve       08-    EEG A/S NL       AED MONITORING      AED: LEV  RANGE: 03-60 Dose    DATE LEVEL    08-  10.7  500 mg BID    08- 14.7 500 mg BID                                   Reviewed the neuroimaging independently       Assessment:       1. Seizure    2. New onset seizure    3. Drug-induced photosensitivity    4. Hyperpigmentation of skin related Chronic Kratom Use            Plan:         NEW ONSET GRAND MAL SEIZURES(GENERALIZED TONIC-CLONIC SEIZURES-GTC) 11-, 12-     CHRONIC USE OF KRATOM            We had a long discussion about the possibility of Kratom-induced seizures vs. Kratom-induced breakthrough seizures with epilepsy. I explained to him that it is extremely hard to determine which is which without detailed evaluation, longitudinal follow up and withdrawal/challenge risky testing.      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 I asked him to allow us to finish his evaluation and explained to him that medical records information must be factual. He agreed to come back and get the evaluation completed.     AEEG to evaluate for epileptiform discharges when patient has availability.     Brain MRI WO to evaluate for epileptogenic lesions.    The patient has been seizure-free for >6 months, compliant with regimen with therapeutic AED level. The patient meets the legal criteria to resume driving (NOT COMMERCIAL DRIVING). I explained to the patient that from a medical standpoint seizure-freedom is defined differently ( 1 year or 3 times the duration between the last 2 seizures). In addition, I explained to the patient that the risk " of breakthrough seizures will ALWAYS be there. I instructed the patient to avoid alcohol, get enough sleep and be complaint with AED regimen. I instructed the patient to avoid driving if AED was skipped, if drank alcohol, sleep-deprived or not feeling well. The patient verbalized full understanding.     The patient was encouraged to watch for full traditional seizure precautions which include but not limited to being careful with driving, biking, high altitudes (ladders, escalators, rock climbing, mountain climbing, skiing, aden diving, moderate to difficult hiking), proximity to fire or fire source, proximity to body of water or swimming alone, operating heavy and potentially risky machinery, using sharp objects, using exercise machines like treadmill and weight lifting. Walking while accompanied on soft surfaces like grass is preferable.The patient was encouraged to shower (without accumulation of water) instead of taking a bath if unsupervised. The patient was made aware that these precautions are especially important during concurrent illnesses, fever, infections, vomiting, changing medications and running out of anti-seizure medications. Instructed the patient to avoid night shifts, sleep deprivation and alcohol or recreational drug use as adequate sleep and avoidance of alcohol/drugs are very important measures to assure good seizure control. The patient was also advised to be careful while taking care for young children without company. The patient is advised to pad the side rails with pillows and blankets if applicable.I strongly recommended lowering the bed to the floor level to decrease the risk of falls during nocturnal seizures that occur during sleep. I also instructed the patient to be very careful with safety sensitive duties. In general, any activity that requires full awareness and would result in serious injury to self and others if a seizure takes place should be approached very cautiously despite  good seizure control.       Continue Levetiracetam/Keppra- mg BID. Check LEV level annually and PRN    Discussed Cannabis due to increased public interest (The plant has many chemicals with various effects: CBD is antiepileptic, THC has mixed effect on epilepsy)      AVOID any substance that could lower seizure threshold including but not limited to:        ALCOHOL AND WITHDRAWAL      TRAMADOL.     MEPERIDINE (DEMEROL)     ALL STIMULANTS-ALL ADHD MEDICATIONS.      CLOZAPINE.      BUPROPION (WELLBUTRIN)     CIPROFLOXACIN.    CYCLOSPORINE.     METOCLOPRAMIDE (REGLAN).     TETRAHYDROCANNABINOL (THC)    KRATOM                         MEDICAL/SURGICAL COMORBIDITIES     bluish-  purple hyper skin pigmentation related to Kratom and sun exposure. Encouraged weaning off of Kratom         All relevant medical comorbidities noted and managed by primary care physician and medical care team.          HEALTHY LIFESTYLE AND PREVENTATIVE CARE    The patient to adhere to the age-appropriate health maintenance guidelines including screening tests and vaccinations. The patient to adhere to  healthy lifestyle, optimal weight, exercise, healthy diet, good sleep hygiene and avoiding drugs including smoking, alcohol and recreational drugs.      RTC in 3 months         Abner Reyez MSN NP      Collaborating Provider: Brooke Tipton MD, FAAN Neurologist/Epileptologist              I spent a total of 30 minutes on the day of the visit.    This includes face to face time and non-face to face time preparing to see the patient (eg, review of tests), obtaining and/or reviewing separately obtained history, documenting clinical information in the electronic or other health record, independently interpreting results and communicating results to the patient/family/caregiver, or care coordinator.

## 2024-08-29 LAB — LEVETIRACETAM SERPL-MCNC: 14 UG/ML (ref 3–60)
